# Patient Record
Sex: MALE | Race: WHITE | Employment: OTHER | URBAN - METROPOLITAN AREA
[De-identification: names, ages, dates, MRNs, and addresses within clinical notes are randomized per-mention and may not be internally consistent; named-entity substitution may affect disease eponyms.]

---

## 2017-01-17 RX ORDER — LEVOTHYROXINE SODIUM 50 UG/1
TABLET ORAL
Qty: 90 TAB | Refills: 0 | Status: SHIPPED | OUTPATIENT
Start: 2017-01-17 | End: 2017-04-10 | Stop reason: SDUPTHER

## 2017-04-10 RX ORDER — LEVOTHYROXINE SODIUM 50 UG/1
TABLET ORAL
Qty: 90 TAB | Refills: 0 | Status: SHIPPED | OUTPATIENT
Start: 2017-04-10 | End: 2017-07-09 | Stop reason: SDUPTHER

## 2017-04-19 ENCOUNTER — OFFICE VISIT (OUTPATIENT)
Dept: CARDIOLOGY CLINIC | Age: 77
End: 2017-04-19

## 2017-04-19 VITALS
BODY MASS INDEX: 39.16 KG/M2 | DIASTOLIC BLOOD PRESSURE: 70 MMHG | HEIGHT: 68 IN | HEART RATE: 60 BPM | RESPIRATION RATE: 16 BRPM | WEIGHT: 258.4 LBS | SYSTOLIC BLOOD PRESSURE: 120 MMHG

## 2017-04-19 DIAGNOSIS — R53.83 FATIGUE, UNSPECIFIED TYPE: ICD-10-CM

## 2017-04-19 DIAGNOSIS — R00.1 BRADYCARDIA: Primary | ICD-10-CM

## 2017-04-19 NOTE — PROGRESS NOTES
[address]: [place]  [phone]  HPI:   Mr. Mika Patel is a 77 yo M with family history of CAD seen in past for exertional shortness of breath. Treadmill stress echo in 8/13/14 was normal with no significant ischemia. Since his last visit, he feels about the same. Still with no chest pains. Still having exertional shortness of breath but suspects some of this may be asthma as he has some wheezing when this happens. Working with allergist on adjusting meds. ECG normal sinus rhythm, PRWP, non specific ST/T abnormality unchanged. He is compensated on exam.  Soc hx. No tobacco.  , children. Desk job tax work. Fam hx. Father with CAD in 76s. Treadmill chr incom    Assessment/Plan:  1. Shortness of breath. Stress test was normal and discussed results. Continue observation absent of chest pain and dyspnea possibly related to asthma. F/u in 1 yr or prn.   2. Obesity. Encouraged weight loss exercise. He  has a past medical history of Asthma (3/30/2010); Chronic prostatitis (3/30/2010); History of colonoscopy (3/30/2010); Hypothyroid (12/20/2011); Hypothyroidism; Prostate cancer ([address] (03/2013); and Skin cancer. He also has no past medical history of Abuse; Anemia NEC; Arrhythmia; Arthritis; Autoimmune disease ([address] CAD (coronary artery disease); Calculus of kidney; Chronic kidney disease; Chronic pain; Congestive heart failure, unspecified; COPD; Depression; Diabetes ([address] Essential hypertension; GERD (gastroesophageal reflux disease); Headache; Hypercholesterolemia; Hyperlipidemia; Hypertension; Liver disease; Psychotic disorder; PUD (peptic ulcer disease); Seizures ([address] Stroke [facility]); Thromboembolus ([address] or Trauma. All other systems negative except as above. PE  Vitals:    04/19/17 1521   BP: 120/70   Pulse: 60   Resp: 16   Weight: 258 lb 6.4 oz (117.2 kg)   Height: 5' 8\" (1.727 m)    Body mass index is 39.29 kg/(m^2).    General appearance - alert, well appearing, and in no distress  Mental status - affect appropriate to mood  Eyes - sclera anicteric, moist mucous membranes  Neck - supple, no significant adenopathy, no JVD  Chest-- clear to auscultation, no wheezes, rales or rhonchi  Heart - normal rate, regular rhythm, normal S1, S2, no murmurs, rubs, clicks or gallops  Abdomen - soft, nontender, nondistended, no masses or organomegaly  Neurological - no focal deficit  Extremities - peripheral pulses normal, no pedal edema      Recent Labs:  Lab Results   Component Value Date/Time    Cholesterol, total 189 11/11/2016 09:47 AM    HDL Cholesterol 62 11/11/2016 09:47 AM    LDL, calculated 106 11/11/2016 09:47 AM    Triglyceride 107 11/11/2016 09:47 AM    CHOL/HDL Ratio 3.2 07/16/2009 10:47 AM     Lab Results   Component Value Date/Time    Creatinine 0.95 11/11/2016 09:47 AM     Lab Results   Component Value Date/Time    BUN 19 11/11/2016 09:47 AM     Lab Results   Component Value Date/Time    Potassium 4.4 11/11/2016 09:47 AM     No results found for: HBA1C, [id number]  Lab Results   Component Value Date/Time    HGB 14.4 11/11/2016 09:47 AM     Lab Results   Component Value Date/Time    PLATELET 348 19/84/6517 09:47 AM       Reviewed:  Past Medical History:   Diagnosis Date    Asthma 3/30/2010    Chronic prostatitis 3/30/2010    History of colonoscopy 3/30/2010    Hypothyroid 12/20/2011    Hypothyroidism     [facility] 03/2013    Dr Marlyn Calvo    Skin cancer      History   Smoking Status    Never Smoker   Smokeless Tobacco    Never Used     History   Alcohol Use    Yes     Comment: occasional     No Known Allergies    Current Outpatient Prescriptions   Medication Sig    levothyroxine (SYNTHROID) 50 mcg tablet TAKE ONE TABLET BY MOUTH ONCE DAILY BEFORE BREAKFAST    doxycycline (VIBRAMYCIN) 100 mg capsule Take 100 mg by mouth daily.  cholecalciferol (VITAMIN D3) 1,000 unit tablet Take 1,000 Units by mouth daily.  DENTA 5000 PLUS 1.1 % crea two (2) times a day.     Silodosin (RAPAFLO) 8 mg Cap Take 8 mg by mouth daily.  GLUCOSAMINE SULFATE (GLUCOSAMINE PO) Take 1 Tab by mouth two (2) times a day.  fexofenadine (ALLEGRA) 180 mg tablet Take 1 Tab by mouth daily.  bupivacaine-EPINEPHrine (MARCAINE-EPINEPHRINE) 0.25 %-1:200,000 soln Used for mohs surgery    mometasone-formoterol (DULERA) 100-5 mcg/actuation HFA inhaler Take 2 Puffs by inhalation two (2) times a day. No current facility-administered medications for this visit.         Argenis Corrales MD  [facility] heart and Vascular [place]  [facility], [address] [facility] [address]  [address]

## 2017-04-19 NOTE — PROGRESS NOTES
[address]: [place]  [phone]  HPI:   Mr. Bev Bentley is a 79 yo M with family history of CAD seen in past for exertional shortness of breath. Treadmill stress echo in 14 was normal with no significant ischemia.     Here now with fatigue. Has been more noticeable the last 2 months. Fatigue is worse in the afternoon. No associated shortness of breath. No chest pain. He denies syncope, lightheadedness, dizziness or LE edema. He denies hx of sleep apnea. He says he occasionally snores. ECG 2016 shows sinus bradycardia, incomplete RBBB, 56 bpm.   Soc hx. No tobacco.  , children. 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 Father with CAD in 76s with bypass. No family hx of PPM. Uncle  age 49 yo of CHF. Assessment/Plan:  1. Fatigue/bradycardia. Has sinus bradycardia. Unclear etiology of fatigue. Will obtain treadmill stress test to assess for chronotropic incompetence. 2. Obesity. Reviewed diet and exercise. He  has a past medical history of Asthma (3/30/2010); Chronic prostatitis (3/30/2010); History of colonoscopy (3/30/2010); Hypothyroid (2011); Hypothyroidism; Prostate cancer ([address] (2013); and Skin cancer. He also has no past medical history of Abuse; Anemia NEC; Arrhythmia; Arthritis; Autoimmune disease ([address] CAD (coronary artery disease); Calculus of kidney; Chronic kidney disease; Chronic pain; Congestive heart failure, unspecified; COPD; Depression; Diabetes ([address] Essential hypertension; GERD (gastroesophageal reflux disease); Headache; Hypercholesterolemia; Hyperlipidemia; Hypertension; Liver disease; Psychotic disorder; PUD (peptic ulcer disease); Seizures ([address] Stroke [facility]); Thromboembolus ([address] or Trauma. All other systems negative except as above. PE  Vitals:    17 1521   BP: 120/70   Pulse: 60   Resp: 16   Weight: 258 lb 6.4 oz (117.2 kg)   Height: 5' 8\" (1.727 m)    Body mass index is 39.29 kg/(m^2). General appearance - alert, well appearing, and in no distress  Mental status - affect appropriate to mood  Eyes - sclera anicteric, moist mucous membranes  Neck - supple,  no JVD  Chest-- clear to auscultation, no wheezes, rales or rhonchi  Heart - normal rate, regular rhythm, normal S1, S2, no murmurs, rubs, clicks or gallops  Abdomen - soft, nontender, nondistended, no masses or organomegaly  Neurological - no focal deficit  Extremities - peripheral pulses normal, no pedal edema      Recent Labs:  Lab Results   Component Value Date/Time    Cholesterol, total 189 11/11/2016 09:47 AM    HDL Cholesterol 62 11/11/2016 09:47 AM    LDL, calculated 106 11/11/2016 09:47 AM    Triglyceride 107 11/11/2016 09:47 AM    CHOL/HDL Ratio 3.2 07/16/2009 10:47 AM     Lab Results   Component Value Date/Time    Creatinine 0.95 11/11/2016 09:47 AM     Lab Results   Component Value Date/Time    BUN 19 11/11/2016 09:47 AM     Lab Results   Component Value Date/Time    Potassium 4.4 11/11/2016 09:47 AM     No results found for: HBA1C, [id number]  Lab Results   Component Value Date/Time    HGB 14.4 11/11/2016 09:47 AM     Lab Results   Component Value Date/Time    PLATELET 754 27/27/0123 09:47 AM       Reviewed:  Past Medical History:   Diagnosis Date    Asthma 3/30/2010    Chronic prostatitis 3/30/2010    History of colonoscopy 3/30/2010    Hypothyroid 12/20/2011    Hypothyroidism     Pro[facility] ([facility] 03/2013    Dr Sloan Cerda    Skin cancer      History   Smoking Status    Never Smoker   Smokeless Tobacco    Never Used     History   Alcohol Use    Yes     Comment: occasional     No Known Allergies    Current Outpatient Prescriptions   Medication Sig    levothyroxine (SYNTHROID) 50 mcg tablet TAKE ONE TABLET BY MOUTH ONCE DAILY BEFORE BREAKFAST    doxycycline (VIBRAMYCIN) 100 mg capsule Take 100 mg by mouth daily.  cholecalciferol (VITAMIN D3) 1,000 unit tablet Take 1,000 Units by mouth daily.     DENTA 5000 PLUS 1.1 % crea two (2) times a day.  Silodosin (RAPAFLO) 8 mg Cap Take 8 mg by mouth daily.  GLUCOSAMINE SULFATE (GLUCOSAMINE PO) Take 1 Tab by mouth two (2) times a day.  fexofenadine (ALLEGRA) 180 mg tablet Take 1 Tab by mouth daily.  bupivacaine-EPINEPHrine (MARCAINE-EPINEPHRINE) 0.25 %-1:200,000 soln Used for mohs surgery    mometasone-formoterol (DULERA) 100-5 mcg/actuation HFA inhaler Take 2 Puffs by inhalation two (2) times a day. No current facility-administered medications for this visit.         Aaron Wooten MD  [facility] heart and Vascular [place]  [facility] 84, [address] [facility] [address]  [facility], [address] [place] [address]

## 2017-04-25 ENCOUNTER — CLINICAL SUPPORT (OUTPATIENT)
Dept: CARDIOLOGY CLINIC | Age: 77
End: 2017-04-25

## 2017-04-25 DIAGNOSIS — R00.1 BRADYCARDIA: ICD-10-CM

## 2017-04-25 DIAGNOSIS — R53.83 FATIGUE, UNSPECIFIED TYPE: ICD-10-CM

## 2017-04-26 ENCOUNTER — TELEPHONE (OUTPATIENT)
Dept: CARDIOLOGY CLINIC | Age: 77
End: 2017-04-26

## 2017-04-26 NOTE — TELEPHONE ENCOUNTER
----- Message from Colette Keebde MD sent at 4/25/2017  4:53 PM EDT -----  Please let pt know his treadmill stress test was normal.  The heart rate went up appropriately. thx        LVM for patient to return call at earliest convenience. Patient returned call, 2 pt identifiers used  Above test results given.

## 2017-07-10 RX ORDER — LEVOTHYROXINE SODIUM 50 UG/1
TABLET ORAL
Qty: 90 TAB | Refills: 0 | Status: SHIPPED | OUTPATIENT
Start: 2017-07-10 | End: 2017-10-16 | Stop reason: SDUPTHER

## 2017-10-16 ENCOUNTER — HOSPITAL ENCOUNTER (OUTPATIENT)
Dept: LAB | Age: 77
Discharge: HOME | End: 2017-10-16
Payer: MEDICARE

## 2017-10-16 ENCOUNTER — OFFICE VISIT (OUTPATIENT)
Dept: FAMILY MEDICINE CLINIC | Age: 77
End: 2017-10-16

## 2017-10-16 VITALS
TEMPERATURE: 98 F | SYSTOLIC BLOOD PRESSURE: 133 MMHG | DIASTOLIC BLOOD PRESSURE: 84 MMHG | RESPIRATION RATE: 18 BRPM | HEART RATE: 69 BPM | WEIGHT: 248.6 LBS | OXYGEN SATURATION: 98 % | BODY MASS INDEX: 37.68 KG/M2 | HEIGHT: 68 IN

## 2017-10-16 DIAGNOSIS — Z83.3 FAMILY HISTORY OF DIABETES MELLITUS: ICD-10-CM

## 2017-10-16 DIAGNOSIS — E03.9 ACQUIRED HYPOTHYROIDISM: Primary | ICD-10-CM

## 2017-10-16 PROBLEM — R79.89 LOW VITAMIN D LEVEL: Status: ACTIVE | Noted: 2017-10-16

## 2017-10-16 PROCEDURE — 84443 ASSAY THYROID STIM HORMONE: CPT

## 2017-10-16 PROCEDURE — 83036 HEMOGLOBIN GLYCOSYLATED A1C: CPT

## 2017-10-16 PROCEDURE — 80053 COMPREHEN METABOLIC PANEL: CPT

## 2017-10-16 PROCEDURE — 84439 ASSAY OF FREE THYROXINE: CPT

## 2017-10-16 RX ORDER — LEVOTHYROXINE SODIUM 50 UG/1
TABLET ORAL
Qty: 90 TAB | Refills: 0 | Status: SHIPPED | OUTPATIENT
Start: 2017-10-16 | End: 2017-12-13

## 2017-10-16 NOTE — PROGRESS NOTES
HISTORY OF PRESENT ILLNESS  Lashawn Pastor is a 78 y.o. male. HPI: Following up on hypothyroidism, taking medication as prescribed, no medication side effects noted. Patient is requesting to check for diabetes, has family history of diabetes. He had cortisone injection on his left knee last week by his orthopedics. He is not considering surgery yet. Past Medical History:   Diagnosis Date    Asthma 3/30/2010    Chronic prostatitis 3/30/2010    History of colonoscopy 3/30/2010    Hypothyroid 12/20/2011    Hypothyroidism     Prostate [facility] ([facility] 03/2013    Dr Aby Cruz    Skin cancer      Past Surgical History:   Procedure Laterality Date    HX COLONOSCOPY  June 2013    polyps were found. Go back in 3 years.  HX OTHER SURGICAL  early 2015    mohs surgery   No Known Allergies    Current Outpatient Prescriptions:     levothyroxine (SYNTHROID) 50 mcg tablet, Take 1 tab po 30 minutes before breakfast, Disp: 90 Tab, Rfl: 0    doxycycline (VIBRAMYCIN) 100 mg capsule, Take 100 mg by mouth daily. , Disp: , Rfl:     cholecalciferol (VITAMIN D3) 1,000 unit tablet, Take 1,000 Units by mouth daily. , Disp: , Rfl:     DENTA 5000 PLUS 1.1 % crea, two (2) times a day., Disp: , Rfl:     mometasone-formoterol (DULERA) 100-5 mcg/actuation HFA inhaler, Take 2 Puffs by inhalation two (2) times a day., Disp: 1 Inhaler, Rfl: 1    Silodosin (RAPAFLO) 8 mg Cap, Take 8 mg by mouth daily. , Disp: , Rfl:     GLUCOSAMINE SULFATE (GLUCOSAMINE PO), Take 1 Tab by mouth two (2) times a day., Disp: , Rfl:     bupivacaine-EPINEPHrine (MARCAINE-EPINEPHRINE) 0.25 %-1:200,000 soln, Used for mohs surgery, Disp: 1.5 mL, Rfl: 0  Review of Systems   Constitutional: Negative. Respiratory: Negative. Cardiovascular: Negative. Gastrointestinal: Negative. Blood pressure 133/84, pulse 69, temperature 98 °F (36.7 °C), temperature source Oral, resp.  rate 18, height 5' 8\" (1.727 m), weight 248 lb 9.6 oz (112.8 kg), SpO2 98 %.    Physical Exam   Constitutional: No distress. HENT:   Mouth/Throat: Oropharynx is clear and moist.   Neck: Neck supple. Cardiovascular: Normal rate and regular rhythm. No murmur heard. Pulmonary/Chest: Effort normal and breath sounds normal.   Abdominal: Soft. Bowel sounds are normal.   Nursing note and vitals reviewed. ASSESSMENT and PLAN    ICD-10-CM ICD-9-CM    1. Acquired hypothyroidism E03.9 244.9 TSH 3RD GENERATION      T4, FREE      levothyroxine (SYNTHROID) 50 mcg tablet   2.  Family history of diabetes mellitus S22.3 S39.1 METABOLIC PANEL, COMPREHENSIVE      HEMOGLOBIN A1C WITH EAG   await labs  Follow up for medicare physical  Pt was given an after visit summary which includes diagnosis, current medicines and vital and voiced understanding of treatment plan

## 2017-10-16 NOTE — PROGRESS NOTES
1. Have you been to the ER, urgent care clinic since your last visit? Hospitalized since your last visit? No    2. Have you seen or consulted any other health care providers outside of the [address] [place] [address] since your last visit? Include any pap smears or colon screening. No     Chief Complaint   Patient presents with    Medication Evaluation     patient here for follow up/refill     Learning assessment complete  Abuse Screening Questionnaire 10/16/2017   Do you ever feel afraid of your partner? N   Are you in a relationship with someone who physically or mentally threatens you? N   Is it safe for you to go home? Y     Fall Risk Assessment, last 12 mths 10/16/2017   Able to walk? Yes   Fall in past 12 months?  No

## 2017-10-17 LAB
ALBUMIN SERPL-MCNC: 4.2 G/DL (ref 3.5–4.8)
ALBUMIN/GLOB SERPL: 1.7 {RATIO} (ref 1.2–2.2)
ALP SERPL-CCNC: 67 IU/L (ref 39–117)
ALT SERPL-CCNC: 14 IU/L (ref 0–44)
AST SERPL-CCNC: 22 IU/L (ref 0–40)
BILIRUB SERPL-MCNC: 0.6 MG/DL (ref 0–1.2)
BUN SERPL-MCNC: 18 MG/DL (ref 8–27)
BUN/CREAT SERPL: 17 (ref 10–24)
CALCIUM SERPL-MCNC: 9.4 MG/DL (ref 8.6–10.2)
CHLORIDE SERPL-SCNC: 98 MMOL/L (ref 96–106)
CO2 SERPL-SCNC: 27 MMOL/L (ref 18–29)
CREAT SERPL-MCNC: 1.06 MG/DL (ref 0.76–1.27)
EST. AVERAGE GLUCOSE BLD GHB EST-MCNC: 114 MG/DL
GLOBULIN SER CALC-MCNC: 2.5 G/DL (ref 1.5–4.5)
GLUCOSE SERPL-MCNC: 98 MG/DL (ref 65–99)
HBA1C MFR BLD: 5.6 % (ref 4.8–5.6)
POTASSIUM SERPL-SCNC: 4.6 MMOL/L (ref 3.5–5.2)
PROT SERPL-MCNC: 6.7 G/DL (ref 6–8.5)
SODIUM SERPL-SCNC: 140 MMOL/L (ref 134–144)
T4 FREE SERPL-MCNC: 1.2 NG/DL (ref 0.82–1.77)
TSH SERPL DL<=0.005 MIU/L-ACNC: 3.13 UIU/ML (ref 0.45–4.5)

## 2017-12-12 ENCOUNTER — HOSPITAL ENCOUNTER (OUTPATIENT)
Dept: LAB | Age: 77
Discharge: HOME | End: 2017-12-12
Payer: MEDICARE

## 2017-12-12 ENCOUNTER — OFFICE VISIT (OUTPATIENT)
Dept: FAMILY MEDICINE CLINIC | Age: 77
End: 2017-12-12

## 2017-12-12 VITALS
RESPIRATION RATE: 18 BRPM | WEIGHT: 246.6 LBS | HEART RATE: 60 BPM | TEMPERATURE: 98.7 F | HEIGHT: 68 IN | OXYGEN SATURATION: 98 % | DIASTOLIC BLOOD PRESSURE: 80 MMHG | BODY MASS INDEX: 37.38 KG/M2 | SYSTOLIC BLOOD PRESSURE: 132 MMHG

## 2017-12-12 DIAGNOSIS — Z71.89 ACP (ADVANCE CARE PLANNING): ICD-10-CM

## 2017-12-12 DIAGNOSIS — R79.89 LOW VITAMIN D LEVEL: ICD-10-CM

## 2017-12-12 DIAGNOSIS — N41.1 CHRONIC PROSTATITIS: ICD-10-CM

## 2017-12-12 DIAGNOSIS — Z00.00 ENCOUNTER FOR INITIAL PREVENTIVE PHYSICAL EXAMINATION COVERED BY MEDICARE: Primary | ICD-10-CM

## 2017-12-12 DIAGNOSIS — E66.9 OBESITY (BMI 30-39.9): ICD-10-CM

## 2017-12-12 DIAGNOSIS — E03.9 ACQUIRED HYPOTHYROIDISM: ICD-10-CM

## 2017-12-12 PROCEDURE — 80053 COMPREHEN METABOLIC PANEL: CPT

## 2017-12-12 PROCEDURE — 82306 VITAMIN D 25 HYDROXY: CPT

## 2017-12-12 PROCEDURE — 80061 LIPID PANEL: CPT

## 2017-12-12 PROCEDURE — 85027 COMPLETE CBC AUTOMATED: CPT

## 2017-12-12 PROCEDURE — 84443 ASSAY THYROID STIM HORMONE: CPT

## 2017-12-12 PROCEDURE — 36415 COLL VENOUS BLD VENIPUNCTURE: CPT

## 2017-12-12 NOTE — PROGRESS NOTES
Lola Sesay is a 78 y.o. male and presents for annual Medicare Wellness Visit. Problem List: Reviewed with patient and discussed risk factors. Patient Active Problem List   Diagnosis Code    Asthma J45.909    Chronic prostatitis N41.1    History of colonoscopy Z98.890    Acquired hypothyroidism E03.9    Low vitamin D level E55.9    Obesity (BMI 30-39. 9) E66.9       Current medical providers:  Patient Care Team:  Kanu Hogan NP as PCP - General (Family Practice)  Cj Flores MD (Cardiology)    [facility]: Reviewed with patient  Past Surgical History:   Procedure Laterality Date    HX COLONOSCOPY  June 2013    polyps were found. Go back in 3 years.  HX OTHER SURGICAL  early 2015    mohs surgery        SH: Reviewed with patient  Social History   Substance Use Topics    Smoking status: Never Smoker    Smokeless tobacco: Never Used    Alcohol use Yes      Comment: occasional       FH: Reviewed with patient  Family History   Problem Relation Age of Onset    Diabetes Mother     Blindness Mother      due to diabetes    Kidney Disease Father     Cancer Father     Heart Surgery Father     Coronary Artery Disease Father     Cancer Sister     Diabetes Brother     Breast Cancer Sister     Alcohol abuse Daughter        Medications/Allergies: Reviewed with patient  Current Outpatient Prescriptions on File Prior to Visit   Medication Sig Dispense Refill    levothyroxine (SYNTHROID) 50 mcg tablet Take 1 tab po 30 minutes before breakfast 90 Tab 0    bupivacaine-EPINEPHrine (MARCAINE-EPINEPHRINE) 0.25 %-1:200,000 soln Used for mohs surgery 1.5 mL 0    doxycycline (VIBRAMYCIN) 100 mg capsule Take 100 mg by mouth daily.  cholecalciferol (VITAMIN D3) 1,000 unit tablet Take 1,000 Units by mouth daily.  DENTA 5000 PLUS 1.1 % crea two (2) times a day.  Silodosin (RAPAFLO) 8 mg Cap Take 8 mg by mouth daily.       GLUCOSAMINE SULFATE (GLUCOSAMINE PO) Take 1 Tab by mouth two (2) times a day.      mometasone-formoterol (DULERA) 100-5 mcg/actuation HFA inhaler Take 2 Puffs by inhalation two (2) times a day. 1 Inhaler 1     No current facility-administered medications on file prior to visit. No Known Allergies    Objective:  Visit Vitals    /80 (BP 1 Location: Left arm, BP Patient Position: Sitting)    Pulse 60    Temp 98.7 °F (37.1 °C) (Oral)    Resp 18    Ht 5' 8\" (1.727 m)    Wt 246 lb 9.6 oz (111.9 kg)    SpO2 98%    BMI 37.5 kg/m2    Body mass index is 37.5 kg/(m^2). Assessment of cognitive impairment: Alert and oriented x 3  Depression Screen:   PHQ over the last two weeks 12/12/2017   Little interest or pleasure in doing things Not at all   Feeling down, depressed or hopeless Not at all   Total Score PHQ 2 0       Fall Risk Assessment:    Fall Risk Assessment, last 12 mths 12/12/2017   Able to walk? Yes   Fall in past 12 months? No       Functional Ability:   Does the patient exhibit a steady gait? yes   How long did it take the patient to get up and walk from a sitting position? One sencnd   Is the patient self reliant?  (ie can do own laundry, meals, household chores)  yes     Does the patient handle his/her own medications? yes     Does the patient handle his/her own money? yes     Is the patients home safe (ie good lighting, handrails on stairs and bath, etc.)? yes     Did you notice or did patient express any hearing difficulties? Yes, will refer to ENT     Did you notice or did patient express any vision difficulties?   no     Were distance and reading eye charts used? no       Advance Care Planning:   Patient was offered the opportunity to discuss advance care planning:  yes     Does patient have an Advance Directive:  yes   If no, did you provide information on Caring Connections?   No, will bring in a copy       Plan:      Orders Placed This Encounter    CBC W/O DIFF    METABOLIC PANEL, COMPREHENSIVE    VITAMIN D, 25 HYDROXY    LIPID PANEL    TSH 3RD GENERATION    OCCULT BLOOD, IMMUNOASSAY (FIT)       Health Maintenance   Topic Date Due    FOBT Q 1 YEAR AGE 50-75  11/18/1992    GLAUCOMA SCREENING Q2Y  11/18/2007    MEDICARE YEARLY EXAM  12/13/2018    DTaP/Tdap/Td series (3 - Td) 12/12/2027    ZOSTER VACCINE AGE 60>  Completed    Pneumococcal 65+ Low/Medium Risk  Completed    Influenza Age 5 to Adult  Completed       *Patient verbalized understanding and agreement with the plan. A copy of the After Visit Summary with personalized health plan was given to the patient today.

## 2017-12-12 NOTE — PROGRESS NOTES
1. Have you been to the ER, urgent care clinic since your last visit? Hospitalized since your last visit? No    2. Have you seen or consulted any other health care providers outside of the [address] [place] [address] since your last visit? Include any pap smears or colon screening. No     Chief Complaint   Patient presents with   [employer] Annual Wellness Visit     patient here for annual exam, no concerns     Learning assessment complete  Abuse Screening Questionnaire 10/16/2017   Do you ever feel afraid of your partner? N   Are you in a relationship with someone who physically or mentally threatens you? N   Is it safe for you to go home? Y     Fall Risk Assessment, last 12 mths 12/12/2017   Able to walk? Yes   Fall in past 12 months?  No

## 2017-12-12 NOTE — ACP (ADVANCE CARE PLANNING)
Advance Care Planning (ACP) Provider Conversation Snapshot    Date of ACP Conversation: 12/12/17  Persons included in Conversation:  patient  Length of ACP Conversation in minutes:  <16 minutes (Non-Billable)    Authorized Decision Maker (if patient is incapable of making informed decisions):    This person is:   n/a          For Patients with Decision Making Capacity:   YES    Conversation Outcomes / Follow-Up Plan:   HE HAS ACP, WILL BRING IN A COPY

## 2017-12-12 NOTE — PROGRESS NOTES
HISTORY OF PRESENT ILLNESS  Bob Rinaldi is a 78 y.o. male. HPI: Patient comes in for medicare physical, he has history of chronic prostatitis, obesity,  hypothyroidism and low vitamin D level  Elevated PSA: For his prostatitis he is under care of urologist, he reports that his last PSA  done a months ago and it was elevated, then he did MRI but it didn't show any changes from previous MRI. He is scheduled for prostate biopsy next month    Hypothyroidism: Last PSA was 3.130, taking synthroid 50 mcg daily , doing well on current dose. Low vitamin D: taking Vitamin D 1000 international unit for vitamin D 3 of 27.1 needs repeat vitamin D level     Obesity: Patient is obese, he reports that he doesn't do regular exercises but works on his yard cutting grass and is active all day. He states that in spite of his knee pain he still works out side. He has appointment with ortho for possible knee surgery. He hasn't been watching his diet as she should. Past Medical History:   Diagnosis Date    Asthma 3/30/2010    Chronic prostatitis 3/30/2010    History of colonoscopy 3/30/2010    Hypothyroid 12/20/2011    Hypothyroidism     Pro[facility] ([facility]/2013    Dr Eyal Duque    Skin cancer      Past Surgical History:   Procedure Laterality Date    HX COLONOSCOPY  June 2013    polyps were found. Go back in 3 years.  HX OTHER SURGICAL  early 2015    mohs surgery   No Known Allergies    Current Outpatient Prescriptions:     levothyroxine (SYNTHROID) 50 mcg tablet, Take 1 tab po 30 minutes before breakfast, Disp: 90 Tab, Rfl: 0    bupivacaine-EPINEPHrine (MARCAINE-EPINEPHRINE) 0.25 %-1:200,000 soln, Used for mohs surgery, Disp: 1.5 mL, Rfl: 0    doxycycline (VIBRAMYCIN) 100 mg capsule, Take 100 mg by mouth daily. , Disp: , Rfl:     cholecalciferol (VITAMIN D3) 1,000 unit tablet, Take 1,000 Units by mouth daily. , Disp: , Rfl:     DENTA 5000 PLUS 1.1 % crea, two (2) times a day., Disp: , Rfl:     Silodosin (RAPAFLO) 8 mg Cap, Take 8 mg by mouth daily. , Disp: , Rfl:     GLUCOSAMINE SULFATE (GLUCOSAMINE PO), Take 1 Tab by mouth two (2) times a day., Disp: , Rfl:     mometasone-formoterol (DULERA) 100-5 mcg/actuation HFA inhaler, Take 2 Puffs by inhalation two (2) times a day., Disp: 1 Inhaler, Rfl: 1  Review of Systems   Constitutional: Negative. HENT: Negative. Respiratory: Negative. Cardiovascular: Negative. Gastrointestinal: Negative. Genitourinary: Negative. Musculoskeletal: Positive for joint pain. Skin: Negative. Neurological: Negative. Psychiatric/Behavioral: Negative. Blood pressure 132/80, pulse 60, temperature 98.7 °F (37.1 °C), temperature source Oral, resp. rate 18, height 5' 8\" (1.727 m), weight 246 lb 9.6 oz (111.9 kg), SpO2 98 %. Physical Exam   Constitutional: He is oriented to person, place, and time. No distress. HENT:   Mouth/Throat: Oropharynx is clear and moist.   Eyes: Conjunctivae and EOM are normal. Pupils are equal, round, and reactive to light. Neck: Normal range of motion. Neck supple. Cardiovascular: Normal rate and regular rhythm. No murmur heard. Pulmonary/Chest: Effort normal and breath sounds normal.   Abdominal: Soft. Bowel sounds are normal.   Musculoskeletal: Normal range of motion. He exhibits tenderness. He exhibits no edema. Neurological: He is alert and oriented to person, place, and time. Skin: Skin is warm and dry. Psychiatric: He has a normal mood and affect. His behavior is normal.   Nursing note and vitals reviewed. ASSESSMENT and PLAN  Diagnoses and all orders for this visit:    1. Encounter for initial preventive physical examination covered by Medicare  -     CBC W/O DIFF  -     METABOLIC PANEL, COMPREHENSIVE  -     LIPID PANEL  -     OCCULT BLOOD, IMMUNOASSAY (FIT)    2. Chronic prostatitis  Sees a urologist  3. Acquired hypothyroidism  -     TSH 3RD GENERATION    4.  Low vitamin D level  -     VITAMIN D, 25 HYDROXY    5. Obesity (BMI 30-39. 9)  Normal BMI discussed with the patient, advised to increase exercise and watch diet    6.  ACP (advance care planning)  Will bring in a copy for his chart  Follow up in six months  Pt was given an after visit summary which includes diagnosis, current medicines and vital and voiced understanding of treatment plan

## 2017-12-13 ENCOUNTER — TELEPHONE (OUTPATIENT)
Dept: FAMILY MEDICINE CLINIC | Age: 77
End: 2017-12-13

## 2017-12-13 ENCOUNTER — HOSPITAL ENCOUNTER (OUTPATIENT)
Dept: LAB | Age: 77
Discharge: HOME | End: 2017-12-13
Payer: MEDICARE

## 2017-12-13 LAB
25(OH)D3+25(OH)D2 SERPL-MCNC: 37.7 NG/ML (ref 30–100)
ALBUMIN SERPL-MCNC: 4.3 G/DL (ref 3.5–4.8)
ALBUMIN/GLOB SERPL: 2 {RATIO} (ref 1.2–2.2)
ALP SERPL-CCNC: 62 IU/L (ref 39–117)
ALT SERPL-CCNC: 16 IU/L (ref 0–44)
AST SERPL-CCNC: 22 IU/L (ref 0–40)
BILIRUB SERPL-MCNC: 0.7 MG/DL (ref 0–1.2)
BUN SERPL-MCNC: 12 MG/DL (ref 8–27)
BUN/CREAT SERPL: 13 (ref 10–24)
CALCIUM SERPL-MCNC: 9.5 MG/DL (ref 8.6–10.2)
CHLORIDE SERPL-SCNC: 102 MMOL/L (ref 96–106)
CHOLEST SERPL-MCNC: 173 MG/DL (ref 100–199)
CO2 SERPL-SCNC: 26 MMOL/L (ref 18–29)
CREAT SERPL-MCNC: 0.94 MG/DL (ref 0.76–1.27)
ERYTHROCYTE [DISTWIDTH] IN BLOOD BY AUTOMATED COUNT: 13.7 % (ref 12.3–15.4)
GFR SERPLBLD CREATININE-BSD FMLA CKD-EPI: 79 ML/MIN/1.73
GFR SERPLBLD CREATININE-BSD FMLA CKD-EPI: 91 ML/MIN/1.73
GLOBULIN SER CALC-MCNC: 2.1 G/DL (ref 1.5–4.5)
GLUCOSE SERPL-MCNC: 99 MG/DL (ref 65–99)
HCT VFR BLD AUTO: 42.9 % (ref 37.5–51)
HDLC SERPL-MCNC: 61 MG/DL
HGB BLD-MCNC: 14.4 G/DL (ref 13–17.7)
INTERPRETATION, 910389: NORMAL
LDLC SERPL CALC-MCNC: 89 MG/DL (ref 0–99)
MCH RBC QN AUTO: 31.6 PG (ref 26.6–33)
MCHC RBC AUTO-ENTMCNC: 33.6 G/DL (ref 31.5–35.7)
MCV RBC AUTO: 94 FL (ref 79–97)
PLATELET # BLD AUTO: 192 X10E3/UL (ref 150–379)
POTASSIUM SERPL-SCNC: 4.4 MMOL/L (ref 3.5–5.2)
PROT SERPL-MCNC: 6.4 G/DL (ref 6–8.5)
RBC # BLD AUTO: 4.55 X10E6/UL (ref 4.14–5.8)
SODIUM SERPL-SCNC: 141 MMOL/L (ref 134–144)
TRIGL SERPL-MCNC: 113 MG/DL (ref 0–149)
TSH SERPL DL<=0.005 MIU/L-ACNC: 4.69 UIU/ML (ref 0.45–4.5)
VLDLC SERPL CALC-MCNC: 23 MG/DL (ref 5–40)
WBC # BLD AUTO: 7.3 X10E3/UL (ref 3.4–10.8)

## 2017-12-13 PROCEDURE — 82270 OCCULT BLOOD FECES: CPT

## 2017-12-13 RX ORDER — LEVOTHYROXINE SODIUM 75 UG/1
75 TABLET ORAL
Qty: 90 TAB | Refills: 1 | Status: SHIPPED | OUTPATIENT
Start: 2017-12-13 | End: 2018-05-18 | Stop reason: SDUPTHER

## 2017-12-16 LAB — HEMOCCULT STL QL IA: POSITIVE

## 2017-12-18 DIAGNOSIS — R19.5 HEME POSITIVE STOOL: Primary | ICD-10-CM

## 2018-01-05 ENCOUNTER — TELEPHONE (OUTPATIENT)
Dept: FAMILY MEDICINE CLINIC | Age: 78
End: 2018-01-05

## 2018-01-05 NOTE — TELEPHONE ENCOUNTER
----- Message from Alexandr Albertacaren sent at 1/4/2018  4:26 PM EST -----  Regarding: JOSÉ MIGUEL Singleton/Telephone  Pt has L knee surgery with Dr. Lizett Bishop on 1/30/18 and wants to know if his recent CPE in 12/2017 can be used for this. He has a Pre-op scheduled with Dr. Jenny Redmond on 1/23/18 but if he needs to come in, he would like to be fit in with JOSÉ MIGUEL Singleton. Best contact number [phone].

## 2018-01-12 ENCOUNTER — OFFICE VISIT (OUTPATIENT)
Dept: FAMILY MEDICINE CLINIC | Age: 78
End: 2018-01-12

## 2018-01-12 VITALS
TEMPERATURE: 97.5 F | HEART RATE: 85 BPM | DIASTOLIC BLOOD PRESSURE: 81 MMHG | OXYGEN SATURATION: 96 % | HEIGHT: 68 IN | BODY MASS INDEX: 38.01 KG/M2 | SYSTOLIC BLOOD PRESSURE: 115 MMHG | WEIGHT: 250.8 LBS | RESPIRATION RATE: 16 BRPM

## 2018-01-12 DIAGNOSIS — R79.89 LOW VITAMIN D LEVEL: ICD-10-CM

## 2018-01-12 DIAGNOSIS — N41.1 CHRONIC PROSTATITIS: ICD-10-CM

## 2018-01-12 DIAGNOSIS — E03.9 ACQUIRED HYPOTHYROIDISM: ICD-10-CM

## 2018-01-12 DIAGNOSIS — E66.9 OBESITY (BMI 30-39.9): ICD-10-CM

## 2018-01-12 DIAGNOSIS — Z01.818 PRE-OP EXAM: Primary | ICD-10-CM

## 2018-01-12 RX ORDER — SILODOSIN 8 MG/1
CAPSULE ORAL
Status: ON HOLD | COMMUNITY
Start: 2017-10-04 | End: 2018-05-08 | Stop reason: CLARIF

## 2018-01-12 RX ORDER — LEVOTHYROXINE SODIUM 50 UG/1
TABLET ORAL
COMMUNITY
Start: 2017-07-10 | End: 2018-01-12

## 2018-01-12 RX ORDER — GLUCOSAMINE SULFATE 1500 MG
5000 POWDER IN PACKET (EA) ORAL
COMMUNITY

## 2018-01-12 RX ORDER — DOXYCYCLINE 100 MG/1
CAPSULE ORAL
COMMUNITY
Start: 2017-10-04 | End: 2018-01-12

## 2018-01-12 NOTE — PROGRESS NOTES
Chief Complaint   Patient presents with    Pre-op Exam     Total Knee Replacement 1/30/2018     1. Have you been to the ER, urgent care clinic since your last visit? Hospitalized since your last visit? No    2. Have you seen or consulted any other health care providers outside of the [facility] since your last visit? Include any pap smears or colon screening.  Yes Dr. Talha Trejo

## 2018-01-12 NOTE — PROGRESS NOTES
Preoperative Evaluation    Date of Exam: 2018    Dejah Gabriel is a 78 y.o. male (:1940) who presents for preoperative evaluation. Latex Allergy: no    Medical History:     Past Medical History:   Diagnosis Date    Asthma 3/30/2010    Chronic prostatitis 3/30/2010    History of colonoscopy 3/30/2010    Hypothyroid 2011    Hypothyroidism     Prostate cancer ([facility] [address] 2013    Dr Giancarlo Maldonado    Skin cancer      Allergies:   No Known Allergies   Medications:     Current Outpatient Prescriptions   Medication Sig    GLUCOSAMINE SULFATE PO Take 1 Tab by mouth two (2) times a day.  levothyroxine (SYNTHROID) 75 mcg tablet Take 1 Tab by mouth Daily (before breakfast).  doxycycline (VIBRAMYCIN) 100 mg capsule Take 100 mg by mouth daily.  DENTA 5000 PLUS 1.1 % crea two (2) times a day.  Silodosin (RAPAFLO) 8 mg Cap Take 8 mg by mouth daily.  cholecalciferol (VITAMIN D3) 1,000 unit cap Take 1,000 Units by mouth daily.  silodosin (RAPAFLO) 8 mg capsule      No current facility-administered medications for this visit. Surgical History:     Past Surgical History:   Procedure Laterality Date    HX COLONOSCOPY  2013    polyps were found. Go back in 3 years.  HX OTHER SURGICAL  early     [facility]     Social History:     Social History     Social History    Marital status:      Spouse name: N/A    Number of children: N/A    Years of education: N/A     Social History Main Topics    Smoking status: Never Smoker    Smokeless tobacco: Never Used    Alcohol use Yes      Comment: occasional    Drug use: No    Sexual activity: Not Asked     Other Topics Concern    None     Social History Narrative       Anesthesia Complications: None  History of abnormal bleeding : None  History of Blood Transfusions: no  Health Care Directive or Living Will: yes, will bring in a copy for his chart    Objective:     ROS:    Feeling well.  No dyspnea or chest pain on exertion. No abdominal pain, change in bowel habits, black or bloody stools. No urinary tract or prostatic symptoms. No neurological complaints. OBJECTIVE:   The patient appears well, alert, oriented x 3, in no distress. Visit Vitals    /81 (BP 1 Location: Left arm, BP Patient Position: Sitting)    Pulse 85    Temp 97.5 °F (36.4 °C) (Oral)    Resp 16    Ht 5' 8\" (1.727 m)    Wt 250 lb 12.8 oz (113.8 kg)    SpO2 96%    BMI 38.13 kg/m2     ENT normal.  Neck supple. No adenopathy or thyromegaly. FRIDA. Lungs are clear, good air entry, no wheezes, rhonchi or rales. Cardiovascular:S1 and S2 normal, no murmurs, regular rate and rhythm. Abdomen is soft without tenderness, guarding, mass or organomegaly.  exam: not chcked. Extremities show no edema, normal peripheral pulses. Neurological is normal without focal findings. DIAGNOSTICS:   1. EKG: EKG FINDINGS - old EKG show incomplete right bundle branch block  2. CXR: will be done in the hospital, pre op  3.  Labs: [facility], pre op    IMPRESSION:     Low risk for planned surgery  No contraindications to planned surgery, pending normal labs    Adela Lui NP   1/12/2018

## 2018-01-22 ENCOUNTER — TELEPHONE (OUTPATIENT)
Dept: FAMILY MEDICINE CLINIC | Age: 78
End: 2018-01-22

## 2018-01-22 NOTE — TELEPHONE ENCOUNTER
----- Message from Goran Cross sent at 1/22/2018  3:30 PM EST -----  Regarding: Areli VALDEZ/Telephone Brion Landau, from [address], is requesting last visit notes, stress testing results and EKG . Best contact office  [phone] Fax [phone].

## 2018-05-08 ENCOUNTER — HOSPITAL ENCOUNTER (OUTPATIENT)
Age: 78
Setting detail: OUTPATIENT SURGERY
Discharge: HOME | End: 2018-05-08
Attending: SPECIALIST | Admitting: SPECIALIST
Payer: MEDICARE

## 2018-05-08 ENCOUNTER — ANESTHESIA EVENT (OUTPATIENT)
Dept: ENDOSCOPY | Age: 78
End: 2018-05-08
Payer: MEDICARE

## 2018-05-08 ENCOUNTER — ANESTHESIA (OUTPATIENT)
Dept: ENDOSCOPY | Age: 78
End: 2018-05-08
Payer: MEDICARE

## 2018-05-08 VITALS
HEIGHT: 69 IN | OXYGEN SATURATION: 97 % | HEART RATE: 67 BPM | TEMPERATURE: 97.8 F | DIASTOLIC BLOOD PRESSURE: 72 MMHG | WEIGHT: 235.06 LBS | BODY MASS INDEX: 34.81 KG/M2 | SYSTOLIC BLOOD PRESSURE: 125 MMHG | RESPIRATION RATE: 16 BRPM

## 2018-05-08 PROCEDURE — 74011250636 HC RX REV CODE- 250/636

## 2018-05-08 PROCEDURE — 74011250636 HC RX REV CODE- 250/636: Performed by: SPECIALIST

## 2018-05-08 PROCEDURE — 76040000009: Performed by: SPECIALIST

## 2018-05-08 PROCEDURE — 74011250637 HC RX REV CODE- 250/637: Performed by: SPECIALIST

## 2018-05-08 PROCEDURE — 77030009426 HC FCPS BIOP ENDOSC BSC -B: Performed by: SPECIALIST

## 2018-05-08 PROCEDURE — 74011000250 HC RX REV CODE- 250

## 2018-05-08 PROCEDURE — 77030010937 HC CLP LIG BSC -I: Performed by: SPECIALIST

## 2018-05-08 PROCEDURE — 88305 TISSUE EXAM BY PATHOLOGIST: CPT | Performed by: SPECIALIST

## 2018-05-08 PROCEDURE — 77030011640 HC PAD GRND REM COVD -A: Performed by: SPECIALIST

## 2018-05-08 PROCEDURE — 77030027957 HC TBNG IRR ENDOGTR BUSS -B: Performed by: SPECIALIST

## 2018-05-08 PROCEDURE — 77030010936 HC CLP LIG BSC -C: Performed by: SPECIALIST

## 2018-05-08 PROCEDURE — 76060000034 HC ANESTHESIA 1.5 TO 2 HR: Performed by: SPECIALIST

## 2018-05-08 PROCEDURE — 77030003657 HC NDL SCLER BSC -B: Performed by: SPECIALIST

## 2018-05-08 PROCEDURE — 77030013992 HC SNR POLYP ENDOSC BSC -B: Performed by: SPECIALIST

## 2018-05-08 RX ORDER — NALOXONE HYDROCHLORIDE 0.4 MG/ML
0.4 INJECTION, SOLUTION INTRAMUSCULAR; INTRAVENOUS; SUBCUTANEOUS
Status: DISCONTINUED | OUTPATIENT
Start: 2018-05-08 | End: 2018-05-08 | Stop reason: HOSPADM

## 2018-05-08 RX ORDER — LORAZEPAM 2 MG/ML
2 INJECTION INTRAMUSCULAR AS NEEDED
Status: DISCONTINUED | OUTPATIENT
Start: 2018-05-08 | End: 2018-05-08 | Stop reason: HOSPADM

## 2018-05-08 RX ORDER — MIDAZOLAM HYDROCHLORIDE 1 MG/ML
.25-1 INJECTION, SOLUTION INTRAMUSCULAR; INTRAVENOUS
Status: DISCONTINUED | OUTPATIENT
Start: 2018-05-08 | End: 2018-05-08 | Stop reason: HOSPADM

## 2018-05-08 RX ORDER — SODIUM CHLORIDE 9 MG/ML
INJECTION, SOLUTION INTRAVENOUS
Status: DISCONTINUED | OUTPATIENT
Start: 2018-05-08 | End: 2018-05-08 | Stop reason: HOSPADM

## 2018-05-08 RX ORDER — FLUMAZENIL 0.1 MG/ML
0.2 INJECTION INTRAVENOUS
Status: DISCONTINUED | OUTPATIENT
Start: 2018-05-08 | End: 2018-05-08 | Stop reason: HOSPADM

## 2018-05-08 RX ORDER — LIDOCAINE HYDROCHLORIDE 20 MG/ML
INJECTION, SOLUTION EPIDURAL; INFILTRATION; INTRACAUDAL; PERINEURAL AS NEEDED
Status: DISCONTINUED | OUTPATIENT
Start: 2018-05-08 | End: 2018-05-08 | Stop reason: HOSPADM

## 2018-05-08 RX ORDER — FENTANYL CITRATE 50 UG/ML
200 INJECTION, SOLUTION INTRAMUSCULAR; INTRAVENOUS
Status: DISCONTINUED | OUTPATIENT
Start: 2018-05-08 | End: 2018-05-08 | Stop reason: HOSPADM

## 2018-05-08 RX ORDER — PROPOFOL 10 MG/ML
INJECTION, EMULSION INTRAVENOUS AS NEEDED
Status: DISCONTINUED | OUTPATIENT
Start: 2018-05-08 | End: 2018-05-08 | Stop reason: HOSPADM

## 2018-05-08 RX ORDER — SODIUM CHLORIDE 9 MG/ML
50 INJECTION, SOLUTION INTRAVENOUS CONTINUOUS
Status: DISCONTINUED | OUTPATIENT
Start: 2018-05-08 | End: 2018-05-08 | Stop reason: HOSPADM

## 2018-05-08 RX ORDER — DEXTROMETHORPHAN/PSEUDOEPHED 2.5-7.5/.8
1.2 DROPS ORAL
Status: DISCONTINUED | OUTPATIENT
Start: 2018-05-08 | End: 2018-05-08 | Stop reason: HOSPADM

## 2018-05-08 RX ORDER — ATROPINE SULFATE 0.1 MG/ML
0.5 INJECTION INTRAVENOUS
Status: DISCONTINUED | OUTPATIENT
Start: 2018-05-08 | End: 2018-05-08 | Stop reason: HOSPADM

## 2018-05-08 RX ORDER — SODIUM CHLORIDE 0.9 % (FLUSH) 0.9 %
5-10 SYRINGE (ML) INJECTION EVERY 8 HOURS
Status: DISCONTINUED | OUTPATIENT
Start: 2018-05-08 | End: 2018-05-08 | Stop reason: HOSPADM

## 2018-05-08 RX ORDER — SODIUM CHLORIDE 0.9 % (FLUSH) 0.9 %
5-10 SYRINGE (ML) INJECTION AS NEEDED
Status: DISCONTINUED | OUTPATIENT
Start: 2018-05-08 | End: 2018-05-08 | Stop reason: HOSPADM

## 2018-05-08 RX ORDER — EPINEPHRINE 0.1 MG/ML
1 INJECTION INTRACARDIAC; INTRAVENOUS
Status: DISCONTINUED | OUTPATIENT
Start: 2018-05-08 | End: 2018-05-08 | Stop reason: HOSPADM

## 2018-05-08 RX ADMIN — LIDOCAINE HYDROCHLORIDE 100 MG: 20 INJECTION, SOLUTION EPIDURAL; INFILTRATION; INTRACAUDAL; PERINEURAL at 12:46

## 2018-05-08 RX ADMIN — PROPOFOL 50 MG: 10 INJECTION, EMULSION INTRAVENOUS at 12:54

## 2018-05-08 RX ADMIN — SODIUM CHLORIDE: 9 INJECTION, SOLUTION INTRAVENOUS at 11:24

## 2018-05-08 RX ADMIN — PROPOFOL 30 MG: 10 INJECTION, EMULSION INTRAVENOUS at 13:01

## 2018-05-08 RX ADMIN — PROPOFOL 100 MG: 10 INJECTION, EMULSION INTRAVENOUS at 12:46

## 2018-05-08 NOTE — H&P
Pre-endoscopy H and P for Colonoscopy    The patient was seen and examined. Date of last colonoscopy: 2013, Polyps  Yes      The airway was assessed and documented. The problem list, past medical history, and medications were reviewed. Patient Active Problem List   Diagnosis Code    Asthma J45.909    Chronic prostatitis N41.1    History of colonoscopy Z98.890    Acquired hypothyroidism E03.9    Low vitamin D level E55.9    Obesity (BMI 30-39. 9) E66.9    ACP (advance care planning) Z71.89     Social History     Social History    Marital status:      Spouse name: N/A    Number of children: N/A    Years of education: N/A     Occupational History    Not on file. Social History Main Topics    Smoking status: Never Smoker    Smokeless tobacco: Never Used    Alcohol use Yes      Comment: occasional    Drug use: No    Sexual activity: Not on file     Other Topics Concern    Not on file     Social History Narrative     Past Medical History:   Diagnosis Date    Asthma 3/30/2010    Chronic prostatitis 3/30/2010    History of colonoscopy 3/30/2010    Hypothyroid 12/20/2011    Hypothyroidism     Prostate cancer ([facility] [address] 03/2013    Dr Warden Sanchez    Skin cancer      The patient has a family history of na    Prior to Admission Medications   Prescriptions Last Dose Informant Patient Reported? Taking? AMOXICILLIN PO 5/8/2018 at Unknown time  Yes Yes   Sig: Take 4 Tabs by mouth once. DENTA 5000 PLUS 1.1 % crea 5/7/2018 at Unknown time  Yes Yes   Sig: two (2) times a day. Silodosin (RAPAFLO) 8 mg Cap 5/7/2018 at Unknown time  Yes Yes   Sig: Take 8 mg by mouth daily. cholecalciferol (VITAMIN D3) 1,000 unit cap 5/7/2018 at Unknown time  Yes Yes   Sig: Take 1,000 Units by mouth daily. doxycycline (VIBRAMYCIN) 100 mg capsule 5/7/2018 at Unknown time  Yes Yes   Sig: Take 100 mg by mouth daily.    levothyroxine (SYNTHROID) 75 mcg tablet 5/7/2018 at Unknown time  No Yes   Sig: Take 1 Tab by mouth Daily (before breakfast). Facility-Administered Medications: None         The review of systems is:  negative for shortness of breath or chest pain      The heart, lungs and mental status were satisfactory for the administration of MAC sedation and for the procedure. Mallampati score: See Anesthesia. I discussed with the patient the objectives, risks, consequences and alternatives to the procedure. Plan: Endoscopic procedure with MAC sedation.     Marlyn Cordova MD  5/8/2018  12:42 PM

## 2018-05-08 NOTE — ANESTHESIA POSTPROCEDURE EVALUATION
Post-Anesthesia Evaluation and Assessment    Patient: Gordon Murray MRN: [id number]  SSN: [id number]    YOB: 1940  Age: 78 y.o. Sex: male       Cardiovascular Function/Vital Signs  Visit Vitals    /71    Pulse 71    Temp 36.6 °C (97.8 °F)    Resp 12    Ht 5' 9\" (1.753 m)    Wt 106.6 kg (235 lb 1 oz)    SpO2 96%    BMI 34.71 kg/m2       Patient is status post MAC anesthesia for Procedure(s):  COLONOSCOPY  ENDOSCOPIC POLYPECTOMY  INJECTION  RESOLUTION CLIP. Nausea/Vomiting: None    Postoperative hydration reviewed and adequate. Pain:  Pain Scale 1: Numeric (0 - 10) (05/08/18 1320)  Pain Intensity 1: 0 (05/08/18 1320)   Managed    Neurological Status: At baseline    Mental Status and Level of Consciousness: Arousable    Pulmonary Status:   O2 Device: Room air (05/08/18 1320)   Adequate oxygenation and airway patent    Complications related to anesthesia: None    Post-anesthesia assessment completed.  No concerns    Signed By: Kimberley Alcala MD     May 8, 2018

## 2018-05-08 NOTE — ANESTHESIA PREPROCEDURE EVALUATION
Anesthetic History               Review of Systems / Medical History  Patient summary reviewed, nursing notes reviewed and pertinent labs reviewed    Pulmonary            Asthma        Neuro/Psych              Cardiovascular                  Exercise tolerance: >4 METS     GI/Hepatic/Renal  Within defined limits              Endo/Other      Hypothyroidism  Obesity     Other Findings            Physical Exam    Airway  Mallampati: I  TM Distance: > 6 cm  Neck ROM: normal range of motion   Mouth opening: Normal     Cardiovascular    Rhythm: regular  Rate: normal         Dental  No notable dental hx       Pulmonary  Breath sounds clear to auscultation               Abdominal         Other Findings            Anesthetic Plan    ASA: 2  Anesthesia type: MAC          Induction: Intravenous  Anesthetic plan and risks discussed with: Patient

## 2018-05-08 NOTE — ROUTINE PROCESS
Frankie Richardson  1940  [id number]    Situation:  Verbal report received from: Anmol Reyes RN  Procedure: Procedure(s):  COLONOSCOPY  ENDOSCOPIC POLYPECTOMY  INJECTION  RESOLUTION CLIP    Background:    Preoperative diagnosis: OCCULT BLOOD POSITIVE  Postoperative diagnosis: moderate sigmoid diverticulosis, colon polyps    :  Dr. Michelle Spain  Assistant(s): Endoscopy Technician-1: Jluis Wise  Endoscopy RN-1: Yarely Alcantar RN    Specimens:   ID Type Source Tests Collected by Time Destination   1 : polyp Preservative Hepatic Flexure  Levora Runner, MD 5/8/2018 1303 Pathology   2 : polyp Preservative Sigmoid  Levora Runner, MD 5/8/2018 1304 Pathology     H. Pylori  no    Assessment:  Intra-procedure medications       Anesthesia gave intra-procedure sedation and medications, see anesthesia flow sheet yes    Intravenous fluids: NS@ KVO     Vital signs stable     Abdominal assessment: round and soft     Recommendation:  Discharge patient per MD order  Family or Friend Pt wife  Permission to share finding with family or friend yes

## 2018-05-08 NOTE — DISCHARGE INSTRUCTIONS
Larry Chandler  [id number]  1940    COLON DISCHARGE INSTRUCTIONS  Discomfort:  Redness at IV site- apply warm compress to area; if redness or soreness persist- contact your physician  There may be a slight amount of blood passed from the rectum  Gaseous discomfort- walking, belching will help relieve any discomfort  You may not operate a vehicle for 12 hours  You may not engage in an occupation involving machinery or appliances for rest of today  You may not drink alcoholic beverages for at least 12 hours  Avoid making any critical decisions for at least 24 hour  DIET:   Regular diet. - however -  remember your colon is empty and a heavy meal will produce gas. Avoid these foods:  vegetables, fried / greasy foods, carbonated drinks for today. ACTIVITY:  You may resume your normal daily activities it is recommended that you spend the remainder of the day resting -  avoid any strenuous activity. CALL M.D. ANY SIGN OF:  Increasing pain, nausea, vomiting  Abdominal distension (swelling)  New increased bleeding (oral or rectal)  Fever (chills)  Pain in chest area  Bloody discharge from nose or mouth  Shortness of breath    You may not  take any Advil, Aspirin, Ibuprofen, Motrin, Aleve, Goodys, or any similar pain or arthritis products for 10 days, ONLY  Tylenol as needed for pain.       Follow-up Instructions:   Call Dr. Autumn Ocampo  Results of procedure / biopsy in 10-14days   Office telephone for problems or questions [phone]    Recommendation for next colonscopy in 5 years  If < 10 years, reason:  above average risk patient

## 2018-05-08 NOTE — IP AVS SNAPSHOT
[address] [facility] [address] [place] [address] 
[phone] Patient: Joanna Bowling MRN: [id number] PDC:14/70/7868 About your hospitalization You were admitted on: May 8, 2018 You last received care in the:  [facility] ENDOSCOPY You were discharged on: May 8, 2018 Why you were hospitalized Your primary diagnosis was:  Not on File Follow-up Information None Discharge Orders None A check lesvia indicates which time of day the medication should be taken. My Medications CONTINUE taking these medications Instructions Each Dose to Equal  
 Morning Noon Evening Bedtime AMOXICILLIN PO Your last dose was: Your next dose is: Take 4 Tabs by mouth once. 4 Tab  
    
   
   
   
  
 cholecalciferol 1,000 unit Cap Commonly known as:  VITAMIN D3 Your last dose was: Your next dose is: Take 1,000 Units by mouth daily. DENTA 5000 PLUS 1.1 % Crea Generic drug:  fluoride (sodium) Your last dose was: Your next dose is:    
   
   
 two (2) times a day. doxycycline 100 mg capsule Commonly known as:  VIBRAMYCIN Your last dose was: Your next dose is: Take 100 mg by mouth daily. 100 mg  
    
   
   
   
  
 levothyroxine 75 mcg tablet Commonly known as:  SYNTHROID Your last dose was: Your next dose is: Take 1 Tab by mouth Daily (before breakfast). 75 mcg RAPAFLO 8 mg capsule Generic drug:  silodosin Your last dose was: Your next dose is: Take 8 mg by mouth daily. 8 mg Discharge Instructions Joanna Bowling [id number] 
1940 COLON DISCHARGE INSTRUCTIONS Discomfort: 
Redness at IV site- apply warm compress to area; if redness or soreness persist- contact your physician There may be a slight amount of blood passed from the rectum Gaseous discomfort- walking, belching will help relieve any discomfort You may not operate a vehicle for 12 hours You may not engage in an occupation involving machinery or appliances for rest of today You may not drink alcoholic beverages for at least 12 hours Avoid making any critical decisions for at least 24 hour DIET: 
 Regular diet.  however -  remember your colon is empty and a heavy meal will produce gas. Avoid these foods:  vegetables, fried / greasy foods, carbonated drinks for today. ACTIVITY: 
You may resume your normal daily activities it is recommended that you spend the remainder of the day resting -  avoid any strenuous activity. CALL M.D. ANY SIGN OF: Increasing pain, nausea, vomiting Abdominal distension (swelling) New increased bleeding (oral or rectal) Fever (chills) Pain in chest area Bloody discharge from nose or mouth Shortness of breath You may not  take any Advil, Aspirin, Ibuprofen, Motrin, Aleve, Goodys, or any similar pain or arthritis products for 10 days, ONLY  Tylenol as needed for pain. Follow-up Instructions: 
 Call Dr. An Sanchez Results of procedure / biopsy in 10-14days Office telephone for problems or questions [phone] Recommendation for next colonscopy in 5 years If < 10 years, reason:  above average risk patient ACO Transitions of Care Introducing [facility] [address] Sandrita Paez offers a voluntary care coordination program to provide high quality service and care to [facility] fee-for-service beneficiaries. Parker Jaramillo was designed to help you enhance your health and well-being through the following services: ? Transitions of Care  support for individuals who are transitioning from one care setting to another (example: Hospital to home). ? Chronic and Complex Care Coordination  support for individuals and caregivers of those with serious or chronic illnesses or with more than one chronic (ongoing) condition and those who take a number of different medications. If you meet specific medical criteria, a [facility] [address] may call you directly to coordinate your care with your primary care physician and your other care providers. For questions about the [facility] programs, please, contact your physicians office. For general questions or additional information about Accountable Care Organizations: 
Please visit www.medicare.gov/acos. html or call 1-800-MEDICARE (8-494.637.4981) TTY users should call 1-744.747.6840. Introducing [facility]! Azael Frankel introduces Edventory patient portal. Now you can access parts of your medical record, email your doctor's office, and request medication refills online. 1. In your internet browser, go to [email or web address] [email or web address] 2. Click on the First Time User? Click Here link in the Sign In box. You will see the New Member Sign Up page. 3. Enter your Edventory Access Code exactly as it appears below. You will not need to use this code after youve completed the sign-up process. If you do not sign up before the expiration date, you must request a new code. · Edventory Access Code: [email or web address] Expires: 8/6/2018  1:25 PM 
 
4. Enter the last four digits of your Social Security Number (xxxx) and Date of Birth (mm/dd/yyyy) as indicated and click Submit. You will be taken to the next sign-up page. 5. Create a SynapSenset ID. This will be your SynapSenset login ID and cannot be changed, so think of one that is secure and easy to remember. 6. Create a SynapSenset password. You can change your password at any time. 7. Enter your Password Reset Question and Answer. This can be used at a later time if you forget your password. 8. Enter your e-mail address. You will receive e-mail notification when new information is available in [address] 9. Click Sign Up. You can now view and download portions of your medical record. 10. Click the Download Summary menu link to download a portable copy of your medical information. If you have questions, please visit the Frequently Asked Questions section of the [employer] website. Remember, Echograph is NOT to be used for urgent needs. For medical emergencies, dial 911. Now available from your iPhone and Android! Introducing Nate Grier As a [facility]phanie Weller patient, I wanted to make you aware of our electronic visit tool called Nate Couchfin. iCouch 24/7 allows you to connect within minutes with a medical provider 24 hours a day, seven days a week via a mobile device or tablet or logging into a secure website from your computer. You can access Nate Grier from anywhere in the [place]. A virtual visit might be right for you when you have a simple condition and feel like you just dont want to get out of bed, or cant get away from work for an appointment, when your regular [facility] provider is not available (evenings, weekends or holidays), or when youre out of town and need minor care. Electronic visits cost only $49 and if the [facility] 24/7 provider determines a prescription is needed to treat your condition, one can be electronically transmitted to a nearby pharmacy*. Please take a moment to enroll today if you have not already done so. The enrollment process is free and takes just a few minutes. To enroll, please download the iCouch 24/[employer] danae to your tablet or phone, or visit [email or web address] to enroll on your computer.    
And, as an [address] [place] [address] patient with a CardioLogs account, the results of your visits will be scanned into your electronic medical record and your primary care provider will be able to view the scanned results. We urge you to continue to see your regular [employer] provider for your ongoing medical care. And while your primary care provider may not be the one available when you seek a Nate Grier virtual visit, the peace of mind you get from getting a real diagnosis real time can be priceless. For more information on Nate Alirezadaniella, view our Frequently Asked Questions (FAQs) at [email or web address]. Sincerely, 
 
Layne Machado MD 
Chief Medical Officer [facility] Sandrita Paez *:  certain medications cannot be prescribed via Nate Grier Providers Seen During Your Hospitalization Provider Specialty Primary office phone Dorian Latham MD Gastroenterology [phone] Your Primary Care Physician (PCP) Primary Care Physician Office Phone Office Fax Patricia MANTILLA [phone] ** None ** You are allergic to the following No active allergies Recent Documentation Height Weight BMI Smoking Status 1.753 m 106.6 kg 34.71 kg/m2 Never Smoker Emergency Contacts Name Discharge Info Relation Home Work Mobile Zay Kruse DISCHARGE CAREGIVER [3] Spouse [3] [phone] Patient Belongings The following personal items are in your possession at time of discharge: 
  Dental Appliances: None  Visual Aid: Glasses Please provide this summary of care documentation to your next provider. Signatures-by signing, you are acknowledging that this After Visit Summary has been reviewed with you and you have received a copy. Patient Signature:  ____________________________________________________________ Date:  ____________________________________________________________  
  
Rajani Cantrell Provider Signature:  ____________________________________________________________ Date:  ____________________________________________________________

## 2018-05-09 NOTE — PROCEDURES
Colonoscopy Procedure Note    Indications:   Personal history of colon polyps (screening only), contrary to prior hx no FHx of colon ca  Referring Physician: Errol Cruz NP   Anesthesia/Sedation:MAC  Endoscopist:  Dr. Lindsay Staton  Assistant:  Endoscopy Technician-1: Carli Hein  Endoscopy RN-1: Merlyn Posada RN    Preoperative diagnosis: OCCULT BLOOD POSITIVE    Postoperative diagnosis: moderate sigmoid diverticulosis, colon polyps      Procedure in Detail:  Informed consent was obtained for the procedure, including sedation. Risks of perforation, hemorrhage, adverse drug reaction, and aspiration were discussed. The patient was placed in the left lateral decubitus position. Based on the pre-procedure assessment, including review of the patient's medical history, medications, allergies, and review of systems, he had been deemed to be an appropriate candidate for moderate sedation; he was therefore sedated with the medications listed above. The patient was monitored continuously with ECG tracing, pulse oximetry, blood pressure monitoring, and direct observations. A rectal examination was performed. The [id number] was inserted into the rectum and advanced under direct vision to the cecum, which was identified by the ileocecal valve and appendiceal orifice. The quality of the colonic preparation was excellent. A careful inspection was made as the colonoscope was withdrawn, including a retroflexed view of the rectum; findings and interventions are described below.       Findings:   Rectum: normal  Sigmoid: moderate diverticulosis; two - 3 mm polyps removed with cold forceps  Descending Colon: mild diverticulosis;  Transverse Colon: normal  Ascending Colon: 5 mm hepatic flexure polyp raised from fold with saline pillow and removed with hot snare  Cecum: normal  Terminal Ileum: not intubated    Specimens:     see above    EBL: None    Complications: None; patient tolerated the procedure well. Recommendations:     - Await pathology. - Repeat colonoscopy in 5 years.      - If < 10 years, reason: above average risk patient    Signed By: Farzana Herrera MD                        May 9, 2018

## 2018-05-21 RX ORDER — LEVOTHYROXINE SODIUM 75 UG/1
TABLET ORAL
Qty: 90 TAB | Refills: 1 | Status: SHIPPED | OUTPATIENT
Start: 2018-05-21 | End: 2018-11-20 | Stop reason: SDUPTHER

## 2018-11-20 RX ORDER — LEVOTHYROXINE SODIUM 75 UG/1
TABLET ORAL
Qty: 90 TAB | Refills: 1 | Status: SHIPPED | OUTPATIENT
Start: 2018-11-20 | End: 2019-05-30 | Stop reason: SDUPTHER

## 2018-12-14 ENCOUNTER — HOSPITAL ENCOUNTER (OUTPATIENT)
Dept: LAB | Age: 78
Discharge: HOME | End: 2018-12-14
Payer: MEDICARE

## 2018-12-14 ENCOUNTER — OFFICE VISIT (OUTPATIENT)
Dept: FAMILY MEDICINE CLINIC | Age: 78
End: 2018-12-14

## 2018-12-14 VITALS
HEART RATE: 64 BPM | BODY MASS INDEX: 36.88 KG/M2 | RESPIRATION RATE: 16 BRPM | TEMPERATURE: 96 F | DIASTOLIC BLOOD PRESSURE: 72 MMHG | WEIGHT: 249 LBS | OXYGEN SATURATION: 96 % | SYSTOLIC BLOOD PRESSURE: 127 MMHG | HEIGHT: 69 IN

## 2018-12-14 DIAGNOSIS — Z00.00 ENCOUNTER FOR INITIAL PREVENTIVE PHYSICAL EXAMINATION COVERED BY MEDICARE: Primary | ICD-10-CM

## 2018-12-14 DIAGNOSIS — N41.1 CHRONIC PROSTATITIS: ICD-10-CM

## 2018-12-14 DIAGNOSIS — E66.9 OBESITY (BMI 30-39.9): ICD-10-CM

## 2018-12-14 DIAGNOSIS — E03.9 ACQUIRED HYPOTHYROIDISM: ICD-10-CM

## 2018-12-14 PROBLEM — E66.01 SEVERE OBESITY (HCC): Status: ACTIVE | Noted: 2018-12-14

## 2018-12-14 PROCEDURE — 80061 LIPID PANEL: CPT

## 2018-12-14 PROCEDURE — 84439 ASSAY OF FREE THYROXINE: CPT

## 2018-12-14 PROCEDURE — 84153 ASSAY OF PSA TOTAL: CPT

## 2018-12-14 PROCEDURE — 84443 ASSAY THYROID STIM HORMONE: CPT

## 2018-12-14 PROCEDURE — 85027 COMPLETE CBC AUTOMATED: CPT

## 2018-12-14 PROCEDURE — 80053 COMPREHEN METABOLIC PANEL: CPT

## 2018-12-14 PROCEDURE — 84154 ASSAY OF PSA FREE: CPT

## 2018-12-14 RX ORDER — TAMSULOSIN HYDROCHLORIDE 0.4 MG/1
CAPSULE ORAL
COMMUNITY
Start: 2018-11-06

## 2018-12-14 NOTE — PROGRESS NOTES
HISTORY OF PRESENT ILLNESS  Lord Arita is a 70 y.o. male. HPI: Following for yearly physical. Patient has history of chronic prostatitis, prostate cancer, hypothyroidism and obesity. He is followed by his urologist for Prostate issues. Past Medical History:   Diagnosis Date    Asthma 3/30/2010    Chronic prostatitis 3/30/2010    History of colonoscopy 3/30/2010    Hypothyroid 2011    Hypothyroidism     Prostate [facility] ([facility] 2013    Dr Guevara Justa Skin cancer      Past Surgical History:   Procedure Laterality Date    COLONOSCOPY N/A 2018    COLONOSCOPY performed by Aiden Thomas MD at [address] HX COLONOSCOPY  2013    polyps were found. Go back in 3 years.  HX ORTHOPAEDIC      left knee replacement    HX OTHER SURGICAL  early     mohs surgery   No Known Allergies    Current Outpatient Medications:     tamsulosin (FLOMAX) 0.4 mg capsule, , Disp: , Rfl:     varicella-zoster recombinant, PF, (SHINGRIX, PF,) 50 mcg/0.5 mL susr injection, 0.5 mL by IntraMUSCular route once for 1 dose. Get the second vaccine in 2 month, Disp: 0.5 mL, Rfl: 1    levothyroxine (SYNTHROID) 75 mcg tablet, TAKE 1 TABLET BY MOUTH ONCE DAILY BEFORE BREAKFAST, Disp: 90 Tab, Rfl: 1    cholecalciferol (VITAMIN D3) 1,000 unit cap, Take 1,000 Units by mouth daily. , Disp: , Rfl:     doxycycline (VIBRAMYCIN) 100 mg capsule, Take 100 mg by mouth daily. , Disp: , Rfl:     DENTA 5000 PLUS 1.1 % crea, two (2) times a day., Disp: , Rfl:     AMOXICILLIN PO, Take 4 Tabs by mouth once., Disp: , Rfl:     Silodosin (RAPAFLO) 8 mg Cap, Take 8 mg by mouth daily. , Disp: , Rfl:   Review of Systems   Constitutional: Negative. Respiratory: Negative. Cardiovascular: Negative. Gastrointestinal: Negative. Genitourinary: Negative. Blood pressure 127/72, pulse 64, temperature 96 °F (35.6 °C), temperature source Oral, resp.  rate 16, height 5' 9\" (1.753 m), weight 249 lb (112.9 kg), SpO2 96 %.    Body mass index is 36.77 kg/m². Physical Exam   Constitutional: No distress. HENT:   Mouth/Throat: Oropharynx is clear and moist.   Neck: Normal range of motion. Neck supple. Cardiovascular: Normal rate and regular rhythm. No murmur heard. Pulmonary/Chest: Effort normal and breath sounds normal.   Abdominal: Soft. Bowel sounds are normal.   Nursing note and vitals reviewed. ASSESSMENT and PLAN  Diagnoses and all orders for this visit:    1. Encounter for initial preventive physical examination covered by Medicare  -     CBC W/O DIFF  -     METABOLIC PANEL, COMPREHENSIVE  -     LIPID PANEL    2. Acquired hypothyroidism  -     TSH 3RD GENERATION  -     T4, FREE    3. Obesity (BMI 30-39. 9)      Normal BMI discussed, advised to watch diet and exercise  4. Chronic prostatitis  -     PSA W/ REFLX FREE PSA    Other orders  -     varicella-zoster recombinant, PF, (SHINGRIX, PF,) 50 mcg/0.5 mL susr injection; 0.5 mL by IntraMUSCular route once for 1 dose.  Get the second vaccine in 2 month

## 2018-12-14 NOTE — PATIENT INSTRUCTIONS
Medicare Wellness Visit, Male    The best way to live healthy is to have a lifestyle where you eat a well-balanced diet, exercise regularly, limit alcohol use, and quit all forms of tobacco/nicotine, if applicable. Regular preventive services are another way to keep healthy. Preventive services (vaccines, screening tests, monitoring & exams) can help personalize your care plan, which helps you manage your own care. Screening tests can find health problems at the earliest stages, when they are easiest to treat. [address] [facility] follows the current, evidence-based guidelines published by the [facility] Hernan Malka ([facility]) when recommending preventive services for our patients. Because we follow these guidelines, sometimes recommendations change over time as research supports it. (For example, a prostate screening blood test is no longer routinely recommended for men with no symptoms.)  Of course, you and your doctor may decide to screen more often for some diseases, based on your risk and co-morbidities (chronic disease you are already diagnosed with). Preventive services for you include:  - Medicare offers their members a free annual wellness visit, which is time for you and your primary care provider to discuss and plan for your preventive service needs. Take advantage of this benefit every year!  -All adults over age 72 should receive the recommended pneumonia vaccines. Current USPSTF guidelines recommend a series of two vaccines for the best pneumonia protection.   -All adults should have a flu vaccine yearly and an ECG.  All adults age 61 and older should receive a shingles vaccine once in their lifetime.    -All adults age 38-68 who are overweight should have a diabetes screening test once every three years.   -Other screening tests & preventive services for persons with diabetes include: an eye exam to screen for diabetic retinopathy, a kidney function test, a foot exam, and stricter control over your cholesterol.   -Cardiovascular screening for adults with routine risk involves an electrocardiogram (ECG) at intervals determined by the provider.   -Colorectal cancer screening should be done for adults age 54-65 with no increased risk factors for colorectal cancer. There are a number of acceptable methods of screening for this type of cancer. Each test has its own benefits and drawbacks. Discuss with your provider what is most appropriate for you during your annual wellness visit. The different tests include: colonoscopy (considered the best screening method), a fecal occult blood test, a fecal DNA test, and sigmoidoscopy.  -All adults born between [facility] should be screened once for Hepatitis C.  -An Abdominal Aortic Aneurysm (AAA) Screening is recommended for men age 73-68 who has ever smoked in their lifetime. Here is a list of your current Health Maintenance items (your personalized list of preventive services) with a due date: There are no preventive care reminders to display for this patient.

## 2018-12-14 NOTE — PROGRESS NOTES
Shiva Lanza is a 70 y.o. male     Chief Complaint   Patient presents with   [facility] Annual Wellness Visit       Visit Vitals  /72 (BP 1 Location: Left arm, BP Patient Position: Sitting)   Pulse 64   Temp 96 °F (35.6 °C) (Oral)   Resp 16   Ht 5' 9\" (1.753 m)   Wt 249 lb (112.9 kg)   SpO2 96%   BMI 36.77 kg/m²       Health Maintenance Due   Topic Date Due    Shingrix Vaccine Age 50> (1 of 2) 11/18/1992    MEDICARE YEARLY EXAM  12/13/2018       1. Have you been to the ER, urgent care clinic since your last visit? Hospitalized since your last visit? No    2. Have you seen or consulted any other health care providers outside of the [address] [place] [address] since your last visit? Include any pap smears or colon screening.  No

## 2018-12-14 NOTE — PROGRESS NOTES
This is the Subsequent Medicare Annual Wellness Exam, performed 12 months or more after the Initial AWV or the last Subsequent AWV    I have reviewed the patient's medical history in detail and updated the computerized patient record. History     Past Medical History:   Diagnosis Date    Asthma 3/30/2010    Chronic prostatitis 3/30/2010    History of colonoscopy 3/30/2010    Hypothyroid 12/20/2011    Hypothyroidism     Prostate cancer ([address] 03/2013    Dr Laya Carlin Skin cancer       Past Surgical History:   Procedure Laterality Date    COLONOSCOPY N/A 5/8/2018    COLONOSCOPY performed by Bill John MD at [facility] HX COLONOSCOPY  June 2013    polyps were found. Go back in 3 years.  HX ORTHOPAEDIC      left knee replacement    HX OTHER SURGICAL  early 2015    mohs surgery     Current Outpatient Medications   Medication Sig Dispense Refill    tamsulosin (FLOMAX) 0.4 mg capsule       varicella-zoster recombinant, PF, (SHINGRIX, PF,) 50 mcg/0.5 mL susr injection 0.5 mL by IntraMUSCular route once for 1 dose. Get the second vaccine in 2 month 0.5 mL 1    levothyroxine (SYNTHROID) 75 mcg tablet TAKE 1 TABLET BY MOUTH ONCE DAILY BEFORE BREAKFAST 90 Tab 1    cholecalciferol (VITAMIN D3) 1,000 unit cap Take 1,000 Units by mouth daily.  doxycycline (VIBRAMYCIN) 100 mg capsule Take 100 mg by mouth daily.  DENTA 5000 PLUS 1.1 % crea two (2) times a day.  AMOXICILLIN PO Take 4 Tabs by mouth once.  Silodosin (RAPAFLO) 8 mg Cap Take 8 mg by mouth daily.        No Known Allergies  Family History   Problem Relation Age of Onset    Diabetes Mother     Blindness Mother         due to diabetes    Kidney Disease Father     Cancer Father     Heart Surgery Father     Coronary Artery Disease Father     Cancer Sister     Diabetes Brother     Breast Cancer Sister     Alcohol abuse Daughter      Social History     Tobacco Use    Smoking status: Never Smoker    Smokeless tobacco: Never Used   Substance Use Topics    Alcohol use: Yes     Comment: occasional     Patient Active Problem List   Diagnosis Code    Asthma J45.909    Chronic prostatitis N41.1    History of colonoscopy Z98.890    Acquired hypothyroidism E03.9    Low vitamin D level R79.89    Obesity (BMI 30-39. 9) E66.9    ACP (advance care planning) Z71.89    Severe obesity ([address] E66.01       Depression Risk Factor Screening:     PHQ over the last two weeks 12/14/2018   Little interest or pleasure in doing things Not at all   Feeling down, depressed, irritable, or hopeless Not at all   Total Score PHQ 2 0     Alcohol Risk Factor Screening: On any occasion in the past three months you have had more than 7 drinks containing alcohol    Functional Ability and Level of Safety:   Hearing Loss  Hearing is good. Activities of Daily Living  The home contains: handrails  Patient does total self care    Fall Risk  Fall Risk Assessment, last 12 mths 12/14/2018   Able to walk? Yes   Fall in past 12 months? No       Abuse Screen  Patient is not abused    Cognitive Screening   Evaluation of Cognitive Function:  Has your family/caregiver stated any concerns about your memory: no  Normal    Patient Care Team   Patient Care Team:  Rivera Smith NP as PCP - General (Family Practice)  Zena Coy MD (Cardiology)    Assessment/Plan   Education and counseling provided:  Are appropriate based on today's review and evaluation    Diagnoses and all orders for this visit:    1. Encounter for initial preventive physical examination covered by Medicare  -     CBC W/O DIFF  -     METABOLIC PANEL, COMPREHENSIVE  -     LIPID PANEL    2. Acquired hypothyroidism  -     TSH 3RD GENERATION  -     T4, FREE    3. Obesity (BMI 30-39.9)    4. Chronic prostatitis  -     PSA W/ REFLX FREE PSA    Other orders  -     varicella-zoster recombinant, PF, (SHINGRIX, PF,) 50 mcg/0.5 mL susr injection; 0.5 mL by IntraMUSCular route once for 1 dose.  Get the second vaccine in 2 month        There are no preventive care reminders to display for this patient.

## 2018-12-15 LAB
ALBUMIN SERPL-MCNC: 4.4 G/DL (ref 3.5–4.8)
ALBUMIN/GLOB SERPL: 2.1 {RATIO} (ref 1.2–2.2)
ALP SERPL-CCNC: 56 IU/L (ref 39–117)
ALT SERPL-CCNC: 15 IU/L (ref 0–44)
AST SERPL-CCNC: 29 IU/L (ref 0–40)
BILIRUB SERPL-MCNC: 1 MG/DL (ref 0–1.2)
BUN SERPL-MCNC: 17 MG/DL (ref 8–27)
BUN/CREAT SERPL: 17 (ref 10–24)
CALCIUM SERPL-MCNC: 9.3 MG/DL (ref 8.6–10.2)
CHLORIDE SERPL-SCNC: 105 MMOL/L (ref 96–106)
CHOLEST SERPL-MCNC: 187 MG/DL (ref 100–199)
CO2 SERPL-SCNC: 26 MMOL/L (ref 20–29)
CREAT SERPL-MCNC: 1 MG/DL (ref 0.76–1.27)
ERYTHROCYTE [DISTWIDTH] IN BLOOD BY AUTOMATED COUNT: 13.6 % (ref 12.3–15.4)
GLOBULIN SER CALC-MCNC: 2.1 G/DL (ref 1.5–4.5)
GLUCOSE SERPL-MCNC: 98 MG/DL (ref 65–99)
HCT VFR BLD AUTO: 42.4 % (ref 37.5–51)
HDLC SERPL-MCNC: 65 MG/DL
HGB BLD-MCNC: 13.8 G/DL (ref 13–17.7)
INTERPRETATION, 910389: NORMAL
LDLC SERPL CALC-MCNC: 103 MG/DL (ref 0–99)
MCH RBC QN AUTO: 31.7 PG (ref 26.6–33)
MCHC RBC AUTO-ENTMCNC: 32.5 G/DL (ref 31.5–35.7)
MCV RBC AUTO: 97 FL (ref 79–97)
PLATELET # BLD AUTO: 170 X10E3/UL (ref 150–379)
POTASSIUM SERPL-SCNC: 4.4 MMOL/L (ref 3.5–5.2)
PROT SERPL-MCNC: 6.5 G/DL (ref 6–8.5)
PSA FREE MFR SERPL: 25.2 %
PSA FREE SERPL-MCNC: 2.22 NG/ML
PSA SERPL-MCNC: 8.8 NG/ML (ref 0–4)
RBC # BLD AUTO: 4.36 X10E6/UL (ref 4.14–5.8)
REFLEX CRITERIA: ABNORMAL
SODIUM SERPL-SCNC: 144 MMOL/L (ref 134–144)
T4 FREE SERPL-MCNC: 1.19 NG/DL (ref 0.82–1.77)
TRIGL SERPL-MCNC: 95 MG/DL (ref 0–149)
TSH SERPL DL<=0.005 MIU/L-ACNC: 2.22 UIU/ML (ref 0.45–4.5)
VLDLC SERPL CALC-MCNC: 19 MG/DL (ref 5–40)
WBC # BLD AUTO: 6.8 X10E3/UL (ref 3.4–10.8)

## 2019-03-19 ENCOUNTER — HOSPITAL ENCOUNTER (EMERGENCY)
Age: 79
Discharge: HOME | End: 2019-03-19
Attending: EMERGENCY MEDICINE
Payer: MEDICARE

## 2019-03-19 ENCOUNTER — APPOINTMENT (OUTPATIENT)
Dept: GENERAL RADIOLOGY | Age: 79
End: 2019-03-19
Attending: EMERGENCY MEDICINE
Payer: MEDICARE

## 2019-03-19 VITALS
BODY MASS INDEX: 37.03 KG/M2 | HEART RATE: 70 BPM | DIASTOLIC BLOOD PRESSURE: 72 MMHG | OXYGEN SATURATION: 98 % | RESPIRATION RATE: 18 BRPM | SYSTOLIC BLOOD PRESSURE: 148 MMHG | HEIGHT: 69 IN | WEIGHT: 250 LBS | TEMPERATURE: 97.6 F

## 2019-03-19 DIAGNOSIS — S90.111A CONTUSION OF RIGHT GREAT TOE WITHOUT DAMAGE TO NAIL, INITIAL ENCOUNTER: ICD-10-CM

## 2019-03-19 DIAGNOSIS — M25.561 ACUTE PAIN OF RIGHT KNEE: ICD-10-CM
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
## 2019-03-19 PROCEDURE — 73562 X-RAY EXAM OF KNEE 3: CPT

## 2019-03-19 PROCEDURE — 99283 EMERGENCY DEPT VISIT LOW MDM: CPT

## 2019-03-19 PROCEDURE — 73630 X-RAY EXAM OF FOOT: CPT

## 2019-03-19 NOTE — ED NOTES
Discharge instructions given to patient by PA. Pt has been given counseling on medication use and verbalizes understanding. IV d/c. Pt instructed to rest, elevate leg and follow up with specialist if pain worsens or persists. Pt ambulated off of unit in no signs of distress.

## 2019-03-19 NOTE — DISCHARGE INSTRUCTIONS
Patient Education        Preventing Falls: Care Instructions  Your Care Instructions    Getting around your home safely can be a challenge if you have injuries or health problems that make it easy for you to fall. Loose rugs and furniture in walkways are among the dangers for many older people who have problems walking or who have poor eyesight. People who have conditions such as arthritis, osteoporosis, or dementia also have to be careful not to fall. You can make your home safer with a few simple measures. Follow-up care is a key part of your treatment and safety. Be sure to make and go to all appointments, and call your doctor if you are having problems. It's also a good idea to know your test results and keep a list of the medicines you take. How can you care for yourself at home? Taking care of yourself  · You may get dizzy if you do not drink enough water. To prevent dehydration, drink plenty of fluids, enough so that your urine is light yellow or clear like water. Choose water and other caffeine-free clear liquids. If you have kidney, heart, or liver disease and have to limit fluids, talk with your doctor before you increase the amount of fluids you drink. · Exercise regularly to improve your strength, muscle tone, and balance. Walk if you can. Swimming may be a good choice if you cannot walk easily. · Have your vision and hearing checked each year or any time you notice a change. If you have trouble seeing and hearing, you might not be able to avoid objects and could lose your balance. · Know the side effects of the medicines you take. Ask your doctor or pharmacist whether the medicines you take can affect your balance. Sleeping pills or sedatives can affect your balance. · Limit the amount of alcohol you drink. Alcohol can impair your balance and other senses. · Ask your doctor whether calluses or corns on your feet need to be removed.  If you wear loose-fitting shoes because of calluses or corns, you can lose your balance and fall. · Talk to your doctor if you have numbness in your feet. Preventing falls at home  · Remove raised doorway thresholds, throw rugs, and clutter. Repair loose carpet or raised areas in the floor. · Move furniture and electrical cords to keep them out of walking paths. · Use nonskid floor wax, and wipe up spills right away, especially on ceramic tile floors. · If you use a walker or cane, put rubber tips on it. If you use crutches, clean the bottoms of them regularly with an abrasive pad, such as steel wool. · Keep your house well lit, especially [place], and outside walkways. Use night-lights in areas such as hallways and bathrooms. Add extra light switches or use remote switches (such as switches that go on or off when you clap your hands) to make it easier to turn lights on if you have to get up during the night. · Install sturdy handrails on stairways. · Move items in your cabinets so that the things you use a lot are on the lower shelves (about waist level). · Keep a cordless phone and a flashlight with new batteries by your bed. If possible, put a phone in each of the main rooms of your house, or carry a cell phone in case you fall and cannot reach a phone. Or, you can wear a device around your neck or wrist. You push a button that sends a signal for help. · Wear low-heeled shoes that fit well and give your feet good support. Use footwear with nonskid soles. Check the heels and soles of your shoes for wear. Repair or replace worn heels or soles. · Do not wear socks without shoes on wood floors. · Walk on the grass when the sidewalks are slippery. If you live in an area that gets snow and ice in the winter, sprinkle salt on slippery steps and sidewalks. Preventing falls in the bath  · Install grab bars and nonskid mats inside and outside your shower or tub and near the toilet and sinks. · Use shower chairs and bath benches.   · Use a hand-held shower head that will allow you to sit while showering. · Get into a tub or shower by putting the weaker leg in first. Get out of a tub or shower with your strong side first.  · Repair loose toilet seats and consider installing a raised toilet seat to make getting on and off the toilet easier. · Keep your bathroom door unlocked while you are in the shower. Where can you learn more? Go to http://pam-shelton.info/. Enter [id number] [id number] in the search box to learn more about \"Preventing Falls: Care Instructions. \"  Current as of: March 16, 2018  Content Version: 11.8  © 2569-0931 [employer]. Care instructions adapted under license by [employer] (which disclaims liability or warranty for this information). If you have questions about a medical condition or this instruction, always ask your healthcare professional. [place] [address] any warranty or liability for your use of this information. Patient Education        Knee Pain or Injury: Care Instructions  Your Care Instructions    Injuries are a common cause of knee problems. Sudden (acute) injuries may be caused by a direct blow to the knee. They can also be caused by abnormal twisting, bending, or falling on the knee. Pain, bruising, or swelling may be severe, and may start within minutes of the injury. Overuse is another cause of knee pain. Other causes are climbing stairs, kneeling, and other activities that use the knee. Everyday wear and tear, especially as you get older, also can cause knee pain. Rest, along with home treatment, often relieves pain and allows your knee to heal. If you have a serious knee injury, you may need tests and treatment. Follow-up care is a key part of your treatment and safety. Be sure to make and go to all appointments, and call your doctor if you are having problems. It's also a good idea to know your test results and keep a list of the medicines you take.   How can you care for yourself at home? · Be safe with medicines. Read and follow all instructions on the label. ? If the doctor gave you a prescription medicine for pain, take it as prescribed. ? If you are not taking a prescription pain medicine, ask your doctor if you can take an over-the-counter medicine. · Rest and protect your knee. Take a break from any activity that may cause pain. · Put ice or a cold pack on your knee for 10 to 20 minutes at a time. Put a thin cloth between the ice and your skin. · Prop up a sore knee on a pillow when you ice it or anytime you sit or lie down for the next 3 days. Try to keep it above the level of your heart. This will help reduce swelling. · If your knee is not swollen, you can put moist heat, a heating pad, or a warm cloth on your knee. · If your doctor recommends an elastic bandage, sleeve, or other type of support for your knee, wear it as directed. · Follow your doctor's instructions about how much weight you can put on your leg. Use a cane, crutches, or a walker as instructed. · Follow your doctor's instructions about activity during your healing process. If you can do mild exercise, slowly increase your activity. · Reach and stay at a healthy weight. Extra weight can strain the joints, especially the knees and hips, and make the pain worse. Losing even a few pounds may help. When should you call for help? Call 911 anytime you think you may need emergency care. For example, call if:    · You have symptoms of a blood clot in your lung (called a pulmonary embolism). These may include:  ? Sudden chest pain. ? Trouble breathing. ?  Coughing up blood.    Call your doctor now or seek immediate medical care if:    · You have severe or increasing pain.     · Your leg or foot turns cold or changes color.     · You cannot stand or put weight on your knee.     · Your knee looks twisted or bent out of shape.     · You cannot move your knee.     · You have signs of infection, such as:  ? Increased pain, swelling, warmth, or redness. ? Red streaks leading from the knee. ? Pus draining from a place on your knee. ? A fever.     · You have signs of a blood clot in your leg (called a deep vein thrombosis), such as:  ? Pain in your calf, back of the knee, thigh, or groin. ? Redness and swelling in your leg or groin.    Watch closely for changes in your health, and be sure to contact your doctor if:    · You have tingling, weakness, or numbness in your knee.     · You have any new symptoms, such as swelling.     · You have bruises from a knee injury that last longer than 2 weeks.     · You do not get better as expected. Where can you learn more? Go to http://pam-shelton.info/. Enter K195 in the search box to learn more about \"Knee Pain or Injury: Care Instructions. \"  Current as of: September 23, 2018  Content Version: 11.9  © 4534-1281 [employer]. Care instructions adapted under license by [employer] (which disclaims liability or warranty for this information). If you have questions about a medical condition or this instruction, always ask your healthcare professional. [place] [address] any warranty or liability for your use of this information. Patient Education        Foot Pain: Care Instructions  Your Care Instructions  Foot injuries that cause pain and swelling are fairly common. Almost all sports or home repair projects can cause a misstep that ends up as foot pain. Normal wear and tear, especially as you get older, also can cause foot pain. Most minor foot injuries will heal on their own, and home treatment is usually all you need to do. If you have a severe injury, you may need tests and treatment. Follow-up care is a key part of your treatment and safety. Be sure to make and go to all appointments, and call your doctor if you are having problems.  It's also a good idea to know your test results and keep a list of the medicines you take. How can you care for yourself at home? · Take pain medicines exactly as directed. ? If the doctor gave you a prescription medicine for pain, take it as prescribed. ? If you are not taking a prescription pain medicine, ask your doctor if you can take an over-the-counter medicine. · Rest and protect your foot. Take a break from any activity that may cause pain. · Put ice or a cold pack on your foot for 10 to 20 minutes at a time. Put a thin cloth between the ice and your skin. · Prop up the sore foot on a pillow when you ice it or anytime you sit or lie down during the next 3 days. Try to keep it above the level of your heart. This will help reduce swelling. · Your doctor may recommend that you wrap your foot with an elastic bandage. Keep your foot wrapped for as long as your doctor advises. · If your doctor recommends crutches, use them as directed. · Wear roomy footwear. · As soon as pain and swelling end, begin gentle exercises of your foot. Your doctor can tell you which exercises will help. When should you call for help? Call 911 anytime you think you may need emergency care. For example, call if:    · Your foot turns pale, white, blue, or cold.    Call your doctor now or seek immediate medical care if:    · You cannot move or stand on your foot.     · Your foot looks twisted or out of its normal position.     · Your foot is not stable when you step down.     · You have signs of infection, such as:  ? Increased pain, swelling, warmth, or redness. ? Red streaks leading from the sore area. ? Pus draining from a place on your foot. ? A fever.     · Your foot is numb or tingly.    Watch closely for changes in your health, and be sure to contact your doctor if:    · You do not get better as expected.     · You have bruises from an injury that last longer than 2 weeks. Where can you learn more? Go to http://paul.info/.   Enter Y237 in the search box to learn more about \"Foot Pain: Care Instructions. \"  Current as of: September 20, 2018  Content Version: 11.9  © 4022-9435 [employer]. Care instructions adapted under license by [employer] (which disclaims liability or warranty for this information). If you have questions about a medical condition or this instruction, always ask your healthcare professional. [address] any warranty or liability for your use of this information.

## 2019-03-19 NOTE — ED PROVIDER NOTES
HPI Pt states that he slipped and fell in his kitchen yesterday morning on wet alonzo. He denies any head or neck injury. He reports right knee and right foot pain. Skin integrity is intact. There is no obvious deformity. Good neurovascular sensation. No obvious joint effusion or joint instability. Pain increases with weight bearing; flexion and extension. He has not had any medications today prior to arrival. 
 
 
Past Medical History:  
Diagnosis Date  Asthma 3/30/2010  Chronic prostatitis 3/30/2010  
 History of colonoscopy 3/30/2010  Hypothyroid 12/20/2011  Hypothyroidism  Prostate cancer ([facility] [address] 03/2013 Dr Cindi Leger Past Surgical History:  
Procedure Laterality Date  COLONOSCOPY N/A 5/8/2018 COLONOSCOPY performed by Evelia Alexandre MD at [facility] HX COLONOSCOPY  June 2013  
 polyps were found. Go back in 3 years.  HX ORTHOPAEDIC    
 left knee replacement  HX OTHER SURGICAL  early 2015  
 mohs surgery Family History:  
Problem Relation Age of Onset  Diabetes Mother  Blindness Mother   
     due to diabetes  Kidney Disease Father  Cancer Father  Heart Surgery Father  Coronary Artery Disease Father  Cancer Sister  Diabetes Brother  Breast Cancer Sister  Alcohol abuse Daughter Social History Socioeconomic History  Marital status:  Spouse name: Not on file  Number of children: Not on file  Years of education: Not on file  Highest education level: Not on file Social Needs  Financial resource strain: Not on file  Food insecurity - worry: Not on file  Food insecurity - inability: Not on file  Transportation needs - medical: Not on file  Transportation needs - non-medical: Not on file Occupational History  Not on file Tobacco Use  Smoking status: Never Smoker  Smokeless tobacco: Never Used Substance and Sexual Activity  Alcohol use:  Yes  
 Comment: occasional  
 Drug use: No  
 Sexual activity: Not on file Other Topics Concern  Not on file Social History Narrative  Not on file ALLERGIES: Patient has no known allergies. Review of Systems Constitutional: Negative for activity change, appetite change, fever and unexpected weight change. HENT: Negative for congestion, facial swelling and trouble swallowing. Respiratory: Negative for cough and shortness of breath. Cardiovascular: Negative for chest pain, palpitations and leg swelling. Gastrointestinal: Negative for abdominal pain, diarrhea, nausea and vomiting. Musculoskeletal: Positive for arthralgias, gait problem and joint swelling. Skin: Negative for rash and wound. All other systems reviewed and are negative. Vitals:  
 03/19/19 1108 Pulse: 84 SpO2: 98% Physical Exam  
Constitutional: He is oriented to person, place, and time. Obese white elderly male; ; non smoker HENT:  
Right Ear: External ear normal.  
Left Ear: External ear normal.  
Mouth/Throat: Oropharynx is clear and moist.  
Neck: Normal range of motion. Neck supple. Cardiovascular: Normal rate and regular rhythm. Pulmonary/Chest: Effort normal and breath sounds normal.  
Abdominal: Soft. Bowel sounds are normal.  
Musculoskeletal: Normal range of motion. He exhibits edema and tenderness. He exhibits no deformity. Reports right knee pain; Skin integrity is intact. There is no obvious deformity. Good neurovascular sensation. No obvious joint effusion or joint instability. Pain increases with weight bearing; flexion and extension. Report right foot pain; Skin integrity is intact. There is obvious swelling particular adjacent to the right great toe;  no obvious deformity. Good neurovascular sensation. No apparent tendon or nerve injury. Lymphadenopathy:  
  He has no cervical adenopathy. Neurological: He is alert and oriented to person, place, and time. Skin: Skin is warm and dry. Psychiatric: He has a normal mood and affect. Nursing note and vitals reviewed. MDM Procedures Patient's results and plan of care have been reviewed with him. Patient and/or family have verbally conveyed their understanding and agreement of the patient's signs, symptoms, diagnosis, treatment and prognosis and additionally agree to follow up as recommended or return to the Emergency Room should his condition change prior to follow-up. Discharge instructions have also been provided to the patient with some educational information regarding his diagnosis as well a list of reasons why he would want to return to the ER prior to his follow-up appointment should his condition change. Nam Sweeney NP

## 2019-03-19 NOTE — ED TRIAGE NOTES
Pt reports carrying a large bag of ice and tripping yesterday morning. Pt c/o right big toe pain, right foot pain, and right knee.

## 2019-03-27 ENCOUNTER — OFFICE VISIT (OUTPATIENT)
Dept: FAMILY MEDICINE CLINIC | Age: 79
End: 2019-03-27

## 2019-03-27 VITALS
OXYGEN SATURATION: 99 % | WEIGHT: 251.6 LBS | HEIGHT: 69 IN | DIASTOLIC BLOOD PRESSURE: 66 MMHG | HEART RATE: 80 BPM | RESPIRATION RATE: 18 BRPM | SYSTOLIC BLOOD PRESSURE: 128 MMHG | BODY MASS INDEX: 37.26 KG/M2 | TEMPERATURE: 98.4 F

## 2019-03-27 DIAGNOSIS — S99.921A INJURY OF RIGHT FOOT, INITIAL ENCOUNTER: Primary | ICD-10-CM

## 2019-03-27 NOTE — PATIENT INSTRUCTIONS
Foot Sprain: Care Instructions  Your Care Instructions    A foot sprain occurs when you stretch or tear the ligaments around your foot. Ligaments are the tough tissues that connect one bone to another. A sprain can happen when you run, fall, or hit your toe against something. Sprains often happen when you jump or change direction quickly. This may occur when you play basketball, soccer, or other sports. Most foot sprains will get better with treatment at home. Follow-up care is a key part of your treatment and safety. Be sure to make and go to all appointments, and call your doctor if you are having problems. It's also a good idea to know your test results and keep a list of the medicines you take. How can you care for yourself at home? · Walk or put weight on your sprained foot as long as it does not hurt. · If your doctor gave you a splint or immobilizer, wear it as directed. If you were given crutches, use them as directed. · For the first 2 days after your injury, avoid hot showers, hot tubs, or hot packs. They may increase swelling. · Put ice or a cold pack on your foot for 10 to 20 minutes at a time to stop swelling. Try this every 1 to 2 hours for 3 days (when you are awake) or until the swelling goes down. Put a thin cloth between the ice pack and your skin. Keep your splint dry. · After 2 or 3 days, if your swelling is gone, put a heating pad (set on low) or a warm cloth on your foot. Some doctors suggest that you go back and forth between hot and cold treatments. · Prop up your foot on a pillow when you ice it or anytime you sit or lie down. Try to keep it above the level of your heart. This will help reduce swelling. · Take pain medicines exactly as directed. ? If the doctor gave you a prescription medicine for pain, take it as prescribed. ? If you are not taking a prescription pain medicine, ask your doctor if you can take an over-the-counter medicine.   · Do any exercises that your doctor or physical therapist suggests. · Return to your usual exercise gradually as you feel better. When should you call for help? Call your doctor now or seek immediate medical care if:    · You have increased or severe pain.     · Your toes are cool or pale or change color.     · Your wrap or splint feels too tight.     · You have signs of a blood clot, such as:  ? Pain in your calf, back of the knee, thigh, or groin. ? Redness and swelling in your leg or groin.     · You have tingling, weakness, or numbness in your leg or foot.    Watch closely for changes in your health, and be sure to contact your doctor if:    · You cannot put any weight on your foot.     · You get a fever.     · You do not get better as expected. Where can you learn more? Go to http://pam-shelton.info/. Enter I847 in the search box to learn more about \"Foot Sprain: Care Instructions. \"  Current as of: September 20, 2018  Content Version: 11.9  © 6900-4017 [employer]. Care instructions adapted under license by [employer] (which disclaims liability or warranty for this information). If you have questions about a medical condition or this instruction, always ask your healthcare professional. [address] any warranty or liability for your use of this information.

## 2019-03-27 NOTE — PROGRESS NOTES
Chief Complaint   Patient presents with    Foot Pain     fell 9 days ago, rt foot injury, swollen , great toe bruised       Reviewed Record in preparation for visit and have obtained necessary documentation. Identified pt with two pt identifiers (Name @ )    There are no preventive care reminders to display for this patient. 1. Have you been to the ER, urgent care clinic since your last visit? Hospitalized since your last visit? Went to [facility] FOR FOOT INJURY    2. Have you seen or consulted any other health care providers outside of the [id number] [place] [address] Philippe since your last visit? Include any pap smears or colon screening.  NO

## 2019-03-27 NOTE — LETTER
3/27/2019 11:52 AM 
 
Mr. Josefa Russ [address] [id number] To Whom It May Concern: 
 
Josefa Russ is currently under the care of [facility]. Please make accommodations for transportation within terminal for patient given right foot injury. If there are questions or concerns please have the patient contact our office. Sincerely, Rio Rivera NP

## 2019-03-27 NOTE — PROGRESS NOTES
[facility] Note    Georgie Gaucher is a 70 y.o. male who was seen in clinic today (3/27/2019). Subjective: Foot Pain  Patient complains of right foot pain. Onset of the symptoms was 9 days ago. Inciting event: slipped in kitchen. Current symptoms include great toe and 1st metatarsal joint. Aggravating symptoms: standing, walking. Patient's overall course: symptoms have progressed to a point and plateaued. . Patient has had no prior foot problems. Previous visits for this problem: Seen [facility] ED with negative X-ray. Evaluation to date: plain films: normal.  Treatment to date: ice, epsom salt soaks. XR Results (most recent):  EXAM: XR FOOT RT MIN 3 V     INDICATION: Right foot pain after fall.     COMPARISON: None.     FINDINGS: Three views of the right foot demonstrate no acute fracture or  dislocation. There is mild first metatarsophalangeal joint space narrowing. The  soft tissues are unremarkable.      IMPRESSION  IMPRESSION: No acute abnormality. Prior to Admission medications    Medication Sig Start Date End Date Taking? Authorizing Provider   tamsulosin (FLOMAX) 0.4 mg capsule  11/6/18  Yes Provider, Historical   levothyroxine (SYNTHROID) 75 mcg tablet TAKE 1 TABLET BY MOUTH ONCE DAILY BEFORE BREAKFAST 11/20/18  Yes Jori Singleton NP   cholecalciferol (VITAMIN D3) 1,000 unit cap Take 1,000 Units by mouth daily. Yes Provider, Historical   doxycycline (VIBRAMYCIN) 100 mg capsule Take 100 mg by mouth daily. 1/26/15  Yes Provider, Historical   DENTA 5000 PLUS 1.1 % crea two (2) times a day. 5/30/14  Yes Provider, Historical   Silodosin (RAPAFLO) 8 mg Cap Take 8 mg by mouth daily. 1/21/11   Provider, Historical          No Known Allergies        ROS  See HPI    Objective:   Physical Exam   Constitutional: He is oriented to person, place, and time. He appears well-developed and well-nourished.    Cardiovascular: Normal rate, regular rhythm and intact distal pulses. Exam reveals no gallop and no friction rub. No murmur heard. Pulmonary/Chest: Effort normal and breath sounds normal. No respiratory distress. Musculoskeletal:        Right foot: There is decreased range of motion, tenderness, bony tenderness (1st metatarsal joint) and swelling. Neurological: He is alert and oriented to person, place, and time. Psychiatric: He has a normal mood and affect. His speech is normal and behavior is normal. Thought content normal.   Nursing note and vitals reviewed. Visit Vitals  /66 (BP 1 Location: Left arm, BP Patient Position: Sitting)   Pulse 80   Temp 98.4 °F (36.9 °C) (Oral)   Resp 18   Ht 5' 9\" (1.753 m)   Wt 251 lb 9.6 oz (114.1 kg)   SpO2 99%   BMI 37.15 kg/m²       Assessment & Plan:  Diagnoses and all orders for this visit:    1. Injury of right foot, initial encounter  Ice, elevate, compression and NSAIDs for pain. Request orthopedics evaluation and treatment.   -     REFERRAL TO ORTHOPEDICS      I have discussed the diagnosis with the patient and the intended plan as seen in the above orders. The patient has received an after-visit summary along with patient information handout. I have discussed medication side effects and warnings with the patient as well. Follow-up and Dispositions    · Return if symptoms worsen or fail to improve.            Cyndi Jacobsen, JOSÉ MIGUEL

## 2019-05-31 RX ORDER — LEVOTHYROXINE SODIUM 75 UG/1
TABLET ORAL
Qty: 90 TAB | Refills: 1 | Status: SHIPPED | OUTPATIENT
Start: 2019-05-31 | End: 2019-12-05 | Stop reason: SDUPTHER

## 2019-10-25 ENCOUNTER — TELEPHONE (OUTPATIENT)
Dept: FAMILY MEDICINE CLINIC | Age: 79
End: 2019-10-25

## 2019-10-25 NOTE — TELEPHONE ENCOUNTER
Pharm on file verified. They are requesting FLUZONE  [id number] to be call in for tis patient.      LOV 03/27/2019

## 2019-12-06 RX ORDER — LEVOTHYROXINE SODIUM 75 UG/1
TABLET ORAL
Qty: 90 TAB | Refills: 1 | Status: SHIPPED | OUTPATIENT
Start: 2019-12-06 | End: 2020-05-19

## 2019-12-16 ENCOUNTER — OFFICE VISIT (OUTPATIENT)
Dept: FAMILY MEDICINE CLINIC | Age: 79
End: 2019-12-16

## 2019-12-16 ENCOUNTER — HOSPITAL ENCOUNTER (OUTPATIENT)
Dept: LAB | Age: 79
Discharge: HOME | End: 2019-12-16
Payer: MEDICARE

## 2019-12-16 VITALS
HEIGHT: 69 IN | RESPIRATION RATE: 18 BRPM | BODY MASS INDEX: 37.38 KG/M2 | WEIGHT: 252.4 LBS | HEART RATE: 72 BPM | DIASTOLIC BLOOD PRESSURE: 78 MMHG | TEMPERATURE: 97.7 F | OXYGEN SATURATION: 92 % | SYSTOLIC BLOOD PRESSURE: 112 MMHG

## 2019-12-16 DIAGNOSIS — Z12.5 SCREENING PSA (PROSTATE SPECIFIC ANTIGEN): ICD-10-CM

## 2019-12-16 DIAGNOSIS — Z00.00 ENCOUNTER FOR INITIAL PREVENTIVE PHYSICAL EXAMINATION COVERED BY MEDICARE: Primary | ICD-10-CM

## 2019-12-16 DIAGNOSIS — E03.9 ACQUIRED HYPOTHYROIDISM: ICD-10-CM

## 2019-12-16 DIAGNOSIS — J40 BRONCHITIS: ICD-10-CM

## 2019-12-16 PROCEDURE — 80061 LIPID PANEL: CPT

## 2019-12-16 PROCEDURE — 84439 ASSAY OF FREE THYROXINE: CPT

## 2019-12-16 PROCEDURE — 85027 COMPLETE CBC AUTOMATED: CPT

## 2019-12-16 PROCEDURE — 36415 COLL VENOUS BLD VENIPUNCTURE: CPT

## 2019-12-16 PROCEDURE — 84443 ASSAY THYROID STIM HORMONE: CPT

## 2019-12-16 PROCEDURE — 80053 COMPREHEN METABOLIC PANEL: CPT

## 2019-12-16 RX ORDER — FINASTERIDE 5 MG/1
TABLET, FILM COATED ORAL
Refills: 5 | COMMUNITY
Start: 2019-11-05

## 2019-12-16 RX ORDER — AZITHROMYCIN 250 MG/1
TABLET, FILM COATED ORAL
Qty: 6 TAB | Refills: 0 | Status: SHIPPED | OUTPATIENT
Start: 2019-12-16 | End: 2019-12-21

## 2019-12-16 NOTE — PROGRESS NOTES
This is the Subsequent Medicare Annual Wellness Exam, performed 12 months or more after the Initial AWV or the last Subsequent AWV    I have reviewed the patient's medical history in detail and updated the computerized patient record. History     Patient Active Problem List   Diagnosis Code    Asthma J45.909    Chronic prostatitis N41.1    History of colonoscopy Z98.890    Acquired hypothyroidism E03.9    Low vitamin D level R79.89    Obesity (BMI 30-39. 9) E66.9    ACP (advance care planning) Z71.89    Severe obesity ([address] E66.01     Past Medical History:   Diagnosis Date    Asthma 3/30/2010    Chronic prostatitis 3/30/2010    History of colonoscopy 3/30/2010    Hypothyroid 12/20/2011    Hypothyroidism     Prostate cancer ([facility] [address] 03/2013    Dr Bryon Campbell Skin cancer       Past Surgical History:   Procedure Laterality Date    COLONOSCOPY N/A 5/8/2018    COLONOSCOPY performed by Roopa Ibarra MD at [address] HX COLONOSCOPY  June 2013    polyps were found. Go back in 3 years.  HX ORTHOPAEDIC      left knee replacement    HX OTHER SURGICAL  early 2015    mohs surgery     Current Outpatient Medications   Medication Sig Dispense Refill    finasteride (PROSCAR) 5 mg tablet TAKE 1 TABLET BY MOUTH ONCE DAILY  5    levothyroxine (SYNTHROID) 75 mcg tablet TAKE 1 TABLET BY MOUTH ONCE DAILY BEFORE BREAKFAST 90 Tab 1    tamsulosin (FLOMAX) 0.4 mg capsule       cholecalciferol (VITAMIN D3) 1,000 unit cap Take 1,000 Units by mouth daily.  DENTA 5000 PLUS 1.1 % crea two (2) times a day.        No Known Allergies    Family History   Problem Relation Age of Onset    Diabetes Mother     Blindness Mother         due to diabetes    Kidney Disease Father     Cancer Father     Heart Surgery Father     Coronary Artery Disease Father     Cancer Sister     Diabetes Brother     Breast Cancer Sister     Alcohol abuse Daughter      Social History     Tobacco Use    Smoking status: Never Smoker    Smokeless tobacco: Never Used   Substance Use Topics    Alcohol use: Yes     Comment: occasional       Depression Risk Factor Screening:     3 most recent PHQ Screens 12/16/2019   Little interest or pleasure in doing things Not at all   Feeling down, depressed, irritable, or hopeless Not at all   Total Score PHQ 2 0       Alcohol Risk Factor Screening (MALE > 65): Do you average more 1 drink per night or more than 7 drinks a week: No    In the past three months have you have had more than 4 drinks containing alcohol on one occasion: Yes      Functional Ability and Level of Safety:   Hearing: Hearing is good. The patient wears hearing aids. Activities of Daily Living: The home contains: handrails, grab bars and rugs  Patient does total self care    Ambulation: with no difficulty    Fall Risk:  Fall Risk Assessment, last 12 mths 12/16/2019   Able to walk? Yes   Fall in past 12 months? No   Fall with injury? -   Number of falls in past 12 months -   Fall Risk Score -       Abuse Screen:  Patient is not abused    Cognitive Screening   Has your family/caregiver stated any concerns about your memory: no  Cognitive Screening: Normal - not done    Patient Care Team   Patient Care Team:  Fransisco Ribeiro NP as PCP - General (Family Practice)  Fransisco Ribeiro NP as PCP - [facility] Empaneled Provider  Shauna Garcia MD (Cardiology)    Assessment/Plan   Education and counseling provided:  Are appropriate based on today's review and evaluation    Diagnoses and all orders for this visit:    1. Encounter for initial preventive physical examination covered by Medicare  -     LIPID PANEL  -     METABOLIC PANEL, COMPREHENSIVE  -     CBC W/O DIFF    2. Acquired hypothyroidism  -     TSH 3RD GENERATION    3. Screening PSA (prostate specific antigen)    4.  BMI 37.0-37.9, adult        Health Maintenance Due   Topic Date Due    Shingrix Vaccine Age 49> (1 of 2) 11/18/1992    MEDICARE YEARLY EXAM  12/15/2019

## 2019-12-16 NOTE — PROGRESS NOTES
HISTORY OF PRESENT ILLNESS  Demar Jose is a 70 y.o. male. HPI: Following up for yearly physical he has histroy of chronic prostatitis, prostate cancer, hypothyroidism and obesity. He is followed by urologist every three months, who checks his PSA. His PSA ranges from [de-identified], the last one   Past Medical History:   Diagnosis Date    Asthma 3/30/2010    Chronic prostatitis 3/30/2010    History of colonoscopy 3/30/2010    Hypothyroid 12/20/2011    Hypothyroidism     Prostate cancer ([facility] [address] 03/2013    Dr Elia Izquierdo Skin cancer      Past Surgical History:   Procedure Laterality Date    COLONOSCOPY N/A 5/8/2018    COLONOSCOPY performed by Claudy Gregg MD at [address] HX COLONOSCOPY  June 2013    polyps were found. Go back in 3 years.  HX ORTHOPAEDIC      left knee replacement    HX OTHER SURGICAL  early 2015    mohs surgery     No Known Allergies    Current Outpatient Medications:     finasteride (PROSCAR) 5 mg tablet, TAKE 1 TABLET BY MOUTH ONCE DAILY, Disp: , Rfl: 5    azithromycin (ZITHROMAX) 250 mg tablet, Take 2 tablets today, then take 1 tablet daily, Disp: 6 Tab, Rfl: 0    levothyroxine (SYNTHROID) 75 mcg tablet, TAKE 1 TABLET BY MOUTH ONCE DAILY BEFORE BREAKFAST, Disp: 90 Tab, Rfl: 1    tamsulosin (FLOMAX) 0.4 mg capsule, , Disp: , Rfl:     cholecalciferol (VITAMIN D3) 1,000 unit cap, Take 1,000 Units by mouth daily. , Disp: , Rfl:     DENTA 5000 PLUS 1.1 % crea, two (2) times a day., Disp: , Rfl:   Review of Systems   Constitutional: Negative. HENT: Negative. Respiratory: Positive for sputum production. Cardiovascular: Negative. Gastrointestinal: Negative. Skin: Negative. Blood pressure 112/78, pulse 72, temperature 97.7 °F (36.5 °C), temperature source Oral, resp. rate 18, height 5' 9\" (1.753 m), weight 252 lb 6.4 oz (114.5 kg), SpO2 92 %. Body mass index is 37.27 kg/m². Physical Exam  Constitutional:       Appearance: Normal appearance. He is obese. HENT:      Mouth/Throat:      Mouth: Mucous membranes are moist.      Pharynx: Oropharynx is clear. Neck:      Musculoskeletal: Normal range of motion and neck supple. Cardiovascular:      Rate and Rhythm: Normal rate and regular rhythm. Heart sounds: No murmur. Pulmonary:      Effort: Pulmonary effort is normal.      Breath sounds: Rhonchi present. Comments: Occasional cough with coarse bronchial breathing   Abdominal:      General: Bowel sounds are normal.      Palpations: Abdomen is soft. Neurological:      Mental Status: He is alert. ASSESSMENT and PLAN  Diagnoses and all orders for this visit:    1. Encounter for initial preventive physical examination covered by Medicare  -     LIPID PANEL  -     METABOLIC PANEL, COMPREHENSIVE  -     CBC W/O DIFF    2. Acquired hypothyroidism  -     TSH 3RD GENERATION  -     T4, FREE    3. Screening PSA (prostate specific antigen)      Done bu urologist    4. BMI 37.0-37.9, adult      Diet and exericse  5. Bronchitis  -     azithromycin (ZITHROMAX) 250 mg tablet;  Take 2 tablets today, then take 1 tablet daily  Follow up in one year  Pt was given an after visit summary which includes diagnosis, current medicines and vital and voiced understanding of treatment plan

## 2019-12-16 NOTE — PROGRESS NOTES
Chief Complaint   Patient presents with    Complete Physical     Fasting     1. Have you been to the ER, urgent care clinic since your last visit? Hospitalized since your last visit? No    2. Have you seen or consulted any other health care providers outside of the [address] [place] [address] since your last visit? Include any pap smears or colon screening.  No

## 2019-12-16 NOTE — PATIENT INSTRUCTIONS
Medicare Wellness Visit, Male The best way to live healthy is to have a lifestyle where you eat a well-balanced diet, exercise regularly, limit alcohol use, and quit all forms of tobacco/nicotine, if applicable. Regular preventive services are another way to keep healthy. Preventive services (vaccines, screening tests, monitoring & exams) can help personalize your care plan, which helps you manage your own care. Screening tests can find health problems at the earliest stages, when they are easiest to treat. [facility] follows the current, evidence-based guidelines published by the [facility] ([facility]) when recommending preventive services for our patients. Because we follow these guidelines, sometimes recommendations change over time as research supports it. (For example, a prostate screening blood test is no longer routinely recommended for men with no symptoms). Of course, you and your doctor may decide to screen more often for some diseases, based on your risk and co-morbidities (chronic disease you are already diagnosed with). Preventive services for you include: - Medicare offers their members a free annual wellness visit, which is time for you and your primary care provider to discuss and plan for your preventive service needs. Take advantage of this benefit every year! 
-All adults over age 72 should receive the recommended pneumonia vaccines. Current USPSTF guidelines recommend a series of two vaccines for the best pneumonia protection.  
-All adults should have a flu vaccine yearly and tetanus vaccine every 10 years. 
-All adults age 48 and older should receive the shingles vaccines (series of two vaccines).       
-All adults age 38-68 who are overweight should have a diabetes screening test once every three years.  
-Other screening tests & preventive services for persons with diabetes include: an eye exam to screen for diabetic retinopathy, a kidney function test, a foot exam, and stricter control over your cholesterol.  
-Cardiovascular screening for adults with routine risk involves an electrocardiogram (ECG) at intervals determined by the provider.  
-Colorectal cancer screening should be done for adults age 54-65 with no increased risk factors for colorectal cancer. There are a number of acceptable methods of screening for this type of cancer. Each test has its own benefits and drawbacks. Discuss with your provider what is most appropriate for you during your annual wellness visit. The different tests include: colonoscopy (considered the best screening method), a fecal occult blood test, a fecal DNA test, and sigmoidoscopy. 
-All adults born between [facility] should be screened once for Hepatitis C. 
-An Abdominal Aortic Aneurysm (AAA) Screening is recommended for men age 73-68 who has ever smoked in their lifetime. Here is a list of your current Health Maintenance items (your personalized list of preventive services) with a due date: 
Health Maintenance Due Topic Date Due  Shingles Vaccine (1 of 2) 11/18/1992 Mine Hogan Annual Well Visit  12/15/2019

## 2019-12-17 LAB
ALBUMIN SERPL-MCNC: 4.5 G/DL (ref 3.5–4.8)
ALBUMIN/GLOB SERPL: 2.1 {RATIO} (ref 1.2–2.2)
ALP SERPL-CCNC: 65 IU/L (ref 39–117)
ALT SERPL-CCNC: 15 IU/L (ref 0–44)
AST SERPL-CCNC: 24 IU/L (ref 0–40)
BILIRUB SERPL-MCNC: 0.6 MG/DL (ref 0–1.2)
BUN SERPL-MCNC: 19 MG/DL (ref 8–27)
BUN/CREAT SERPL: 18 (ref 10–24)
CALCIUM SERPL-MCNC: 9.4 MG/DL (ref 8.6–10.2)
CHLORIDE SERPL-SCNC: 105 MMOL/L (ref 96–106)
CHOLEST SERPL-MCNC: 191 MG/DL (ref 100–199)
CO2 SERPL-SCNC: 22 MMOL/L (ref 20–29)
CREAT SERPL-MCNC: 1.07 MG/DL (ref 0.76–1.27)
ERYTHROCYTE [DISTWIDTH] IN BLOOD BY AUTOMATED COUNT: 12.7 % (ref 12.3–15.4)
GLOBULIN SER CALC-MCNC: 2.1 G/DL (ref 1.5–4.5)
GLUCOSE SERPL-MCNC: 96 MG/DL (ref 65–99)
HCT VFR BLD AUTO: 42.3 % (ref 37.5–51)
HDLC SERPL-MCNC: 59 MG/DL
HGB BLD-MCNC: 15 G/DL (ref 13–17.7)
INTERPRETATION, 910389: NORMAL
LDLC SERPL CALC-MCNC: 106 MG/DL (ref 0–99)
MCH RBC QN AUTO: 32.9 PG (ref 26.6–33)
MCHC RBC AUTO-ENTMCNC: 35.5 G/DL (ref 31.5–35.7)
MCV RBC AUTO: 93 FL (ref 79–97)
PLATELET # BLD AUTO: 183 X10E3/UL (ref 150–450)
POTASSIUM SERPL-SCNC: 4.4 MMOL/L (ref 3.5–5.2)
PROT SERPL-MCNC: 6.6 G/DL (ref 6–8.5)
RBC # BLD AUTO: 4.56 X10E6/UL (ref 4.14–5.8)
SODIUM SERPL-SCNC: 141 MMOL/L (ref 134–144)
T4 FREE SERPL-MCNC: 1.12 NG/DL (ref 0.82–1.77)
TRIGL SERPL-MCNC: 129 MG/DL (ref 0–149)
TSH SERPL DL<=0.005 MIU/L-ACNC: 2.09 UIU/ML (ref 0.45–4.5)
VLDLC SERPL CALC-MCNC: 26 MG/DL (ref 5–40)
WBC # BLD AUTO: 7.6 X10E3/UL (ref 3.4–10.8)

## 2020-05-19 RX ORDER — LEVOTHYROXINE SODIUM 75 UG/1
TABLET ORAL
Qty: 90 TAB | Refills: 0 | Status: SHIPPED | OUTPATIENT
Start: 2020-05-19 | End: 2020-09-09

## 2020-09-09 RX ORDER — LEVOTHYROXINE SODIUM 75 UG/1
TABLET ORAL
Qty: 90 TAB | Refills: 0 | Status: SHIPPED | OUTPATIENT
Start: 2020-09-09 | End: 2020-12-01

## 2020-12-01 RX ORDER — LEVOTHYROXINE SODIUM 75 UG/1
TABLET ORAL
Qty: 90 TAB | Refills: 0 | Status: SHIPPED | OUTPATIENT
Start: 2020-12-01 | End: 2021-02-24

## 2020-12-23 ENCOUNTER — OFFICE VISIT (OUTPATIENT)
Dept: FAMILY MEDICINE CLINIC | Age: 80
End: 2020-12-23
Payer: MEDICARE

## 2020-12-23 VITALS
DIASTOLIC BLOOD PRESSURE: 79 MMHG | SYSTOLIC BLOOD PRESSURE: 126 MMHG | RESPIRATION RATE: 16 BRPM | HEART RATE: 63 BPM | BODY MASS INDEX: 37.89 KG/M2 | TEMPERATURE: 97.7 F | OXYGEN SATURATION: 96 % | WEIGHT: 255.8 LBS | HEIGHT: 69 IN

## 2020-12-23 DIAGNOSIS — E03.9 ACQUIRED HYPOTHYROIDISM: ICD-10-CM

## 2020-12-23 DIAGNOSIS — E66.01 SEVERE OBESITY (HCC): ICD-10-CM

## 2020-12-23 DIAGNOSIS — E55.9 VITAMIN D DEFICIENCY: ICD-10-CM

## 2020-12-23 DIAGNOSIS — Z00.00 ENCOUNTER FOR INITIAL PREVENTIVE PHYSICAL EXAMINATION COVERED BY MEDICARE: Primary | ICD-10-CM

## 2020-12-23 DIAGNOSIS — Z12.5 SCREENING PSA (PROSTATE SPECIFIC ANTIGEN): ICD-10-CM

## 2020-12-23 PROCEDURE — G8419 CALC BMI OUT NRM PARAM NOF/U: HCPCS | Performed by: NURSE PRACTITIONER

## 2020-12-23 PROCEDURE — 1101F PT FALLS ASSESS-DOCD LE1/YR: CPT | Performed by: NURSE PRACTITIONER

## 2020-12-23 PROCEDURE — G8427 DOCREV CUR MEDS BY ELIG CLIN: HCPCS | Performed by: NURSE PRACTITIONER

## 2020-12-23 PROCEDURE — G8432 DEP SCR NOT DOC, RNG: HCPCS | Performed by: NURSE PRACTITIONER

## 2020-12-23 PROCEDURE — G0439 PPPS, SUBSEQ VISIT: HCPCS | Performed by: NURSE PRACTITIONER

## 2020-12-23 PROCEDURE — G8536 NO DOC ELDER MAL SCRN: HCPCS | Performed by: NURSE PRACTITIONER

## 2020-12-23 NOTE — PROGRESS NOTES
Chief Complaint   Patient presents with   [facility] Annual Wellness Visit       1. Have you been to the ER, urgent care clinic since your last visit? Hospitalized since your last visit? No    2. Have you seen or consulted any other health care providers outside of the [address] [place] [address] since your last visit? Include any pap smears or colon screening. No    3 most recent PHQ Screens 12/23/2020   Little interest or pleasure in doing things Not at all   Feeling down, depressed, irritable, or hopeless Not at all   Total Score PHQ 2 0       Abuse Screening Questionnaire 12/23/2020   Do you ever feel afraid of your partner? N   Are you in a relationship with someone who physically or mentally threatens you? N   Is it safe for you to go home? Y       ADL Assessment 12/23/2020   Feeding yourself No Help Needed   Getting from bed to chair No Help Needed   Getting dressed No Help Needed   Bathing or showering No Help Needed   Walk across the room (includes cane/walker) No Help Needed   Using the telphone No Help Needed   Taking your medications No Help Needed   Preparing meals No Help Needed   Managing money (expenses/bills) No Help Needed   Moderately strenuous housework (laundry) No Help Needed   Shopping for personal items (toiletries/medicines) No Help Needed   Shopping for groceries No Help Needed   Driving No Help Needed   Climbing a flight of stairs No Help Needed   Getting to places beyond walking distances No Help Needed       Fall Risk Assessment, last 12 mths 12/23/2020   Able to walk? Yes   Fall in past 12 months? -   Number of falls in past 12 months -   Fall with injury?  -

## 2020-12-23 NOTE — PROGRESS NOTES
[facility] Note  Subjective:      Rafi Prado is a 68 y.o. male who presents for medicare physical. She has history of prostate cancer, hypothyroidism skin cancer . He is followed by dermatologist every six months and urologist every six months. His last PAS in June was 5.0    Past Medical History:   Diagnosis Date    Asthma 3/30/2010    Chronic prostatitis 3/30/2010    History of colonoscopy 3/30/2010    Hypothyroid 12/20/2011    Hypothyroidism     Prostate cancer ([address] 03/2013    Dr Jaqueline Cordova Skin cancer      Past Surgical History:   Procedure Laterality Date    COLONOSCOPY N/A 5/8/2018    COLONOSCOPY performed by Balbina Chambers MD at [address] HX COLONOSCOPY  June 2013    polyps were found. Go back in 3 years.  HX ORTHOPAEDIC      left knee replacement    HX OTHER SURGICAL  early 2015    mohs surgery       Current Outpatient Medications   Medication Sig Dispense Refill    Euthyrox 75 mcg tablet TAKE 1 TABLET BY MOUTH ONCE DAILY BEFORE BREAKFAST 90 Tab 0    finasteride (PROSCAR) 5 mg tablet TAKE 1 TABLET BY MOUTH ONCE DAILY  5    tamsulosin (FLOMAX) 0.4 mg capsule       cholecalciferol (VITAMIN D3) 1,000 unit cap Take 1,000 Units by mouth daily.  DENTA 5000 PLUS 1.1 % crea two (2) times a day. No Known Allergies    ROS:   Complete review of systems was reviewed with pertinent information listed in HPI. Review of Systems   Constitutional: Negative. HENT: Positive for hearing loss. Respiratory: Negative. Cardiovascular: Negative. Gastrointestinal: Negative. Genitourinary: Negative. Skin: Negative. Psychiatric/Behavioral: Negative. Objective:     Visit Vitals  /79 (BP 1 Location: Left arm, BP Patient Position: Sitting)   Pulse 63   Temp 97.7 °F (36.5 °C) (Temporal)   Resp 16   Ht 5' 9\" (1.753 m)   Wt 255 lb 12.8 oz (116 kg)   SpO2 96%   BMI 37.78 kg/m²       Vitals and Nurse Documentation reviewed. Physical Exam  Constitutional:       Appearance: Normal appearance. He is obese. HENT:      Right Ear: Tympanic membrane normal.      Left Ear: Tympanic membrane normal.      Mouth/Throat:      Mouth: Mucous membranes are moist.   Eyes:      Extraocular Movements: Extraocular movements intact. Pupils: Pupils are equal, round, and reactive to light. Neck:      Musculoskeletal: Normal range of motion and neck supple. Cardiovascular:      Rate and Rhythm: Normal rate and regular rhythm. Pulses: Normal pulses. Heart sounds: Normal heart sounds. No murmur. Pulmonary:      Effort: Pulmonary effort is normal.      Breath sounds: Normal breath sounds. Abdominal:      General: Bowel sounds are normal.      Palpations: Abdomen is soft. Skin:     General: Skin is dry. Neurological:      Mental Status: He is alert. Psychiatric:         Mood and Affect: Mood normal.         Thought Content: Thought content normal.         Assessment/Plan:     Diagnoses and all orders for this visit:    1. Encounter for initial preventive physical examination covered by Medicare  -     CBC W/O DIFF; Future  -     LIPID PANEL; Future  -     METABOLIC PANEL, COMPREHENSIVE; Future    2. Acquired hypothyroidism  -     TSH 3RD GENERATION; Future  -     T4, FREE; Future    3. Screening PSA (prostate specific antigen)  -     PSA W/ REFLX FREE PSA; Future    4. BMI 37.0-37.9, adult    5. Vitamin D deficiency  -     PSA W/ REFLX FREE PSA; Future  -     VITAMIN D, 25 HYDROXY; Future    6.  Severe obesity ([address]          Pt expressed understanding with the diagnosis and plan        Discussed expected course/resolution/complications of diagnosis in detail with patient.    Medication risks/benefits/costs/interactions/alternatives discussed with patient.    Pt was given an after visit summary which includes diagnoses, current medications & vitals.  Pt expressed understanding with the diagnosis and plan  This is the Subsequent Medicare Annual Wellness Exam, performed 12 months or more after the Initial AWV or the last Subsequent AWV    I have reviewed the patient's medical history in detail and updated the computerized patient record. Depression Risk Factor Screening:     3 most recent PHQ Screens 12/23/2020   Little interest or pleasure in doing things Not at all   Feeling down, depressed, irritable, or hopeless Not at all   Total Score PHQ 2 0       Alcohol Risk Screen    Do you average more than 1 drink per night or more than 7 drinks a week: No    In the past three months have you have had more than 4 drinks containing alcohol on one occasion: Yes        Functional Ability and Level of Safety:    Hearing: The patient wears hearing aids. Activities of Daily Living: The home contains: handrails, grab bars and rugs  Patient does total self care      Ambulation: with no difficulty     Fall Risk:  Fall Risk Assessment, last 12 mths 12/23/2020   Able to walk? Yes   Fall in past 12 months? -   Number of falls in past 12 months -   Fall with injury? -      Abuse Screen:  Patient is not abused       Cognitive Screening    Has your family/caregiver stated any concerns about your memory: no     Cognitive Screening: Normal - Verbal Fluency Test    Assessment/Plan   Education and counseling provided:  Are appropriate based on today's review and evaluation    Diagnoses and all orders for this visit:    1. Encounter for initial preventive physical examination covered by Medicare  -     CBC W/O DIFF; Future  -     LIPID PANEL; Future  -     METABOLIC PANEL, COMPREHENSIVE; Future    2. Acquired hypothyroidism  -     TSH 3RD GENERATION; Future  -     T4, FREE; Future    3. Screening PSA (prostate specific antigen)  -     PSA W/ REFLX FREE PSA; Future    4. BMI 37.0-37.9, adult    5. Vitamin D deficiency  -     PSA W/ REFLX FREE PSA; Future  -     VITAMIN D, 25 HYDROXY; Future    6.  Severe obesity ([address]        Health Maintenance Due     Health Maintenance Due   Topic Date Due    Shingrix Vaccine Age 49> (1 of 2) 11/18/1992    GLAUCOMA SCREENING Q2Y  04/11/2020    Medicare Yearly Exam  12/16/2020       Patient Care Team   Patient Care Team:  Adán Moss NP as PCP - General (Family Medicine)  Ad[facility] Moss [facility]  Meaghan Carl MD (Cardiology)    History     Patient Active Problem List   Diagnosis Code    Asthma J45.909    Chronic prostatitis N41.1    History of colonoscopy Z98.890    Acquired hypothyroidism E03.9    Low vitamin D level R79.89    Obesity (BMI 30-39. 9) E66.9    ACP (advance care planning) Z71.89    Severe obesity ([address] E66.01     Past Medical History:   Diagnosis Date    Asthma 3/30/2010    Chronic prostatitis 3/30/2010    History of colonoscopy 3/30/2010    Hypothyroid 12/20/2011    Hypothyroidism     Pro[address] [address] 03/2013    Dr Jeremy Kothari Skin cancer       Past Surgical History:   Procedure Laterality Date    COLONOSCOPY N/A 5/8/2018    COLONOSCOPY performed by Shivani Juan MD at [address] HX COLONOSCOPY  June 2013    polyps were found. Go back in 3 years.  HX ORTHOPAEDIC      left knee replacement    HX OTHER SURGICAL  early 2015    mohs surgery     Current Outpatient Medications   Medication Sig Dispense Refill    Euthyrox 75 mcg tablet TAKE 1 TABLET BY MOUTH ONCE DAILY BEFORE BREAKFAST 90 Tab 0    finasteride (PROSCAR) 5 mg tablet TAKE 1 TABLET BY MOUTH ONCE DAILY  5    tamsulosin (FLOMAX) 0.4 mg capsule       cholecalciferol (VITAMIN D3) 1,000 unit cap Take 1,000 Units by mouth daily.  DENTA 5000 PLUS 1.1 % crea two (2) times a day.        No Known Allergies    Family History   Problem Relation Age of Onset    Diabetes Mother     Blindness Mother         due to diabetes    Kidney Disease Father     Cancer Father     Heart Surgery Father     Coronary Artery Disease Father     Cancer Sister     Diabetes Brother     Breast Cancer Sister     Alcohol abuse Daughter      Social History     Tobacco Use    Smoking status: Never Smoker    Smokeless tobacco: Never Used   Substance Use Topics    Alcohol use: Yes     Comment: occasional

## 2020-12-24 LAB
25(OH)D3 SERPL-MCNC: 40.2 NG/ML (ref 30–100)
ALBUMIN SERPL-MCNC: 4 G/DL (ref 3.5–5)
ALBUMIN/GLOB SERPL: 1.4 {RATIO} (ref 1.1–2.2)
ALP SERPL-CCNC: 72 U/L (ref 45–117)
ALT SERPL-CCNC: 20 U/L (ref 12–78)
ANION GAP SERPL CALC-SCNC: 4 MMOL/L (ref 5–15)
AST SERPL-CCNC: 26 U/L (ref 15–37)
BILIRUB SERPL-MCNC: 0.7 MG/DL (ref 0.2–1)
BUN SERPL-MCNC: 16 MG/DL (ref 6–20)
BUN/CREAT SERPL: 15 (ref 12–20)
CALCIUM SERPL-MCNC: 9.1 MG/DL (ref 8.5–10.1)
CHLORIDE SERPL-SCNC: 106 MMOL/L (ref 97–108)
CHOLEST SERPL-MCNC: 207 MG/DL
CO2 SERPL-SCNC: 30 MMOL/L (ref 21–32)
CREAT SERPL-MCNC: 1.1 MG/DL (ref 0.7–1.3)
ERYTHROCYTE [DISTWIDTH] IN BLOOD BY AUTOMATED COUNT: 12.4 % (ref 11.5–14.5)
GLOBULIN SER CALC-MCNC: 2.9 G/DL (ref 2–4)
GLUCOSE SERPL-MCNC: 98 MG/DL (ref 65–100)
HCT VFR BLD AUTO: 42.9 % (ref 36.6–50.3)
HDLC SERPL-MCNC: 61 MG/DL
HDLC SERPL: 3.4 {RATIO} (ref 0–5)
HGB BLD-MCNC: 14.4 G/DL (ref 12.1–17)
LDLC SERPL CALC-MCNC: 119.8 MG/DL (ref 0–100)
LIPID PROFILE,FLP: ABNORMAL
MCH RBC QN AUTO: 32.1 PG (ref 26–34)
MCHC RBC AUTO-ENTMCNC: 33.6 G/DL (ref 30–36.5)
MCV RBC AUTO: 95.8 FL (ref 80–99)
NRBC # BLD: 0 K/UL (ref 0–0.01)
NRBC BLD-RTO: 0 PER 100 WBC
PLATELET # BLD AUTO: 178 K/UL (ref 150–400)
PMV BLD AUTO: 9.6 FL (ref 8.9–12.9)
POTASSIUM SERPL-SCNC: 4.2 MMOL/L (ref 3.5–5.1)
PROT SERPL-MCNC: 6.9 G/DL (ref 6.4–8.2)
PSA SERPL-MCNC: 3.8 NG/ML (ref 0–4)
RBC # BLD AUTO: 4.48 M/UL (ref 4.1–5.7)
REFLEX CRITERIA: NORMAL
SODIUM SERPL-SCNC: 140 MMOL/L (ref 136–145)
T4 FREE SERPL-MCNC: 1 NG/DL (ref 0.8–1.5)
TRIGL SERPL-MCNC: 131 MG/DL (ref ?–150)
TSH SERPL DL<=0.05 MIU/L-ACNC: 2.78 UIU/ML (ref 0.36–3.74)
VLDLC SERPL CALC-MCNC: 26.2 MG/DL
WBC # BLD AUTO: 6.9 K/UL (ref 4.1–11.1)

## 2021-02-24 RX ORDER — LEVOTHYROXINE SODIUM 75 UG/1
TABLET ORAL
Qty: 90 TAB | Refills: 0 | Status: SHIPPED | OUTPATIENT
Start: 2021-02-24 | End: 2021-05-25

## 2021-05-25 RX ORDER — LEVOTHYROXINE SODIUM 75 UG/1
TABLET ORAL
Qty: 90 TABLET | Refills: 0 | Status: SHIPPED | OUTPATIENT
Start: 2021-05-25 | End: 2021-08-20 | Stop reason: SDUPTHER

## 2021-06-01 ENCOUNTER — OFFICE VISIT (OUTPATIENT)
Dept: FAMILY MEDICINE CLINIC | Age: 81
End: 2021-06-01
Payer: MEDICARE

## 2021-06-01 VITALS
WEIGHT: 247.4 LBS | HEART RATE: 58 BPM | BODY MASS INDEX: 36.64 KG/M2 | OXYGEN SATURATION: 98 % | TEMPERATURE: 97.6 F | DIASTOLIC BLOOD PRESSURE: 61 MMHG | HEIGHT: 69 IN | SYSTOLIC BLOOD PRESSURE: 100 MMHG | RESPIRATION RATE: 16 BRPM

## 2021-06-01 DIAGNOSIS — E03.9 ACQUIRED HYPOTHYROIDISM: Primary | ICD-10-CM

## 2021-06-01 DIAGNOSIS — E66.01 SEVERE OBESITY (HCC): ICD-10-CM

## 2021-06-01 LAB
T4 FREE SERPL-MCNC: 1 NG/DL (ref 0.8–1.5)
TSH SERPL DL<=0.05 MIU/L-ACNC: 2.07 UIU/ML (ref 0.36–3.74)

## 2021-06-01 PROCEDURE — G8427 DOCREV CUR MEDS BY ELIG CLIN: HCPCS | Performed by: NURSE PRACTITIONER

## 2021-06-01 PROCEDURE — G8417 CALC BMI ABV UP PARAM F/U: HCPCS | Performed by: NURSE PRACTITIONER

## 2021-06-01 PROCEDURE — G8536 NO DOC ELDER MAL SCRN: HCPCS | Performed by: NURSE PRACTITIONER

## 2021-06-01 PROCEDURE — 99213 OFFICE O/P EST LOW 20 MIN: CPT | Performed by: NURSE PRACTITIONER

## 2021-06-01 PROCEDURE — 1101F PT FALLS ASSESS-DOCD LE1/YR: CPT | Performed by: NURSE PRACTITIONER

## 2021-06-01 PROCEDURE — G0463 HOSPITAL OUTPT CLINIC VISIT: HCPCS | Performed by: NURSE PRACTITIONER

## 2021-06-01 PROCEDURE — G8432 DEP SCR NOT DOC, RNG: HCPCS | Performed by: NURSE PRACTITIONER

## 2021-06-01 NOTE — PROGRESS NOTES
Chief Complaint   Patient presents with    Thyroid Problem     follow up       1. Have you been to the ER, urgent care clinic since your last visit? Hospitalized since your last visit? No    2. Have you seen or consulted any other health care providers outside of the [address] [place] [address] since your last visit? Include any pap smears or colon screening.  No    3 most recent PHQ Screens 6/1/2021   Little interest or pleasure in doing things Not at all   Feeling down, depressed, irritable, or hopeless Not at all   Total Score PHQ 2 0

## 2021-06-01 NOTE — PROGRESS NOTES
[facility] Note  Subjective:      Alvaro Barron is a 68 y.o. male who presents to check TSH and free T4. He has history of hypothyroidism, obesity and thyroid cancer, he is followed by urologist .  He will bring in his ACP    Past Medical History:   Diagnosis Date    Asthma 3/30/2010    Chronic prostatitis 3/30/2010    History of colonoscopy 3/30/2010    Hypothyroid 12/20/2011    Hypothyroidism     Prostate cancer ([facility] [address] 03/2013    Dr Vinod Gómez Skin cancer      Past Surgical History:   Procedure Laterality Date    COLONOSCOPY N/A 5/8/2018    COLONOSCOPY performed by Tatyana Jones MD at [address] HX COLONOSCOPY  June 2013    polyps were found. Go back in 3 years.  HX ORTHOPAEDIC      left knee replacement    [facility] OTHER [facility]GICAL  [facility]       Current Outpatient Medications   Medication Sig Dispense Refill    Euthyrox 75 mcg tablet TAKE 1 TABLET BY MOUTH ONCE DAILY BEFORE BREAKFAST 90 Tablet 0    finasteride (PROSCAR) 5 mg tablet TAKE 1 TABLET BY MOUTH ONCE DAILY  5    tamsulosin (FLOMAX) 0.4 mg capsule       cholecalciferol (VITAMIN D3) 1,000 unit cap Take 1,000 Units by mouth daily.  DENTA 5000 PLUS 1.1 % crea two (2) times a day. No Known Allergies    ROS:   Complete review of systems was reviewed with pertinent information listed in HPI. Review of Systems   Constitutional: Negative. HENT: Negative. Respiratory: Negative. Cardiovascular: Negative. Gastrointestinal: Negative. Objective:     Visit Vitals  /61 (BP 1 Location: Right arm, BP Patient Position: Sitting, BP Cuff Size: Large adult)   Pulse (!) 58   Temp 97.6 °F (36.4 °C) (Temporal)   Resp 16   Ht 5' 9\" (1.753 m)   Wt 247 lb 6.4 oz (112.2 kg)   SpO2 98%   BMI 36.53 kg/m²       Vitals and Nurse Documentation reviewed. Physical Exam  Constitutional:       Appearance: Normal appearance. He is obese.    HENT:      Mouth/Throat:      Mouth: Mucous membranes are moist.   Cardiovascular:      Rate and Rhythm: Normal rate and regular rhythm. Pulses: Normal pulses. Heart sounds: Normal heart sounds. No murmur heard. Pulmonary:      Effort: Pulmonary effort is normal.      Breath sounds: Normal breath sounds. Abdominal:      General: Bowel sounds are normal.      Palpations: Abdomen is soft. Musculoskeletal:      Cervical back: Normal range of motion and neck supple. Neurological:      Mental Status: He is alert. Psychiatric:         Mood and Affect: Mood normal.         Thought Content: Thought content normal.         Assessment/Plan:     Diagnoses and all orders for this visit:    1. Acquired hypothyroidism  -     TSH 3RD GENERATION; Future  -     T4, FREE; Future    2.  Severe obesity ([facility] [address]    follow up in six months      Pt expressed understanding with the diagnosis and plan        Discussed expected course/resolution/complications of diagnosis in detail with patient.    Medication risks/benefits/costs/interactions/alternatives discussed with patient.    Pt was given an after visit summary which includes diagnoses, current medications & vitals.  Pt expressed understanding with the diagnosis and plan

## 2021-08-20 RX ORDER — LEVOTHYROXINE SODIUM 75 UG/1
TABLET ORAL
Qty: 90 TABLET | Refills: 0 | Status: SHIPPED | OUTPATIENT
Start: 2021-08-20 | End: 2021-08-25

## 2021-08-25 RX ORDER — LEVOTHYROXINE SODIUM 75 UG/1
TABLET ORAL
Qty: 90 TABLET | Refills: 0 | Status: SHIPPED | OUTPATIENT
Start: 2021-08-25 | End: 2022-02-15

## 2021-12-28 ENCOUNTER — OFFICE VISIT (OUTPATIENT)
Dept: FAMILY MEDICINE CLINIC | Age: 81
End: 2021-12-28
Payer: MEDICARE

## 2021-12-28 VITALS
OXYGEN SATURATION: 95 % | HEIGHT: 69 IN | WEIGHT: 240 LBS | HEART RATE: 61 BPM | TEMPERATURE: 97.4 F | BODY MASS INDEX: 35.55 KG/M2 | RESPIRATION RATE: 16 BRPM | SYSTOLIC BLOOD PRESSURE: 113 MMHG | DIASTOLIC BLOOD PRESSURE: 66 MMHG

## 2021-12-28 DIAGNOSIS — Z00.00 ENCOUNTER FOR INITIAL PREVENTIVE PHYSICAL EXAMINATION COVERED BY MEDICARE: Primary | ICD-10-CM

## 2021-12-28 DIAGNOSIS — E03.9 ACQUIRED HYPOTHYROIDISM: ICD-10-CM

## 2021-12-28 LAB
ALBUMIN SERPL-MCNC: 4 G/DL (ref 3.5–5)
ALBUMIN/GLOB SERPL: 1.4 {RATIO} (ref 1.1–2.2)
ALP SERPL-CCNC: 80 U/L (ref 45–117)
ALT SERPL-CCNC: 27 U/L (ref 12–78)
ANION GAP SERPL CALC-SCNC: 5 MMOL/L (ref 5–15)
AST SERPL-CCNC: 21 U/L (ref 15–37)
BILIRUB SERPL-MCNC: 0.6 MG/DL (ref 0.2–1)
BUN SERPL-MCNC: 14 MG/DL (ref 6–20)
BUN/CREAT SERPL: 13 (ref 12–20)
CALCIUM SERPL-MCNC: 9.4 MG/DL (ref 8.5–10.1)
CHLORIDE SERPL-SCNC: 106 MMOL/L (ref 97–108)
CO2 SERPL-SCNC: 28 MMOL/L (ref 21–32)
CREAT SERPL-MCNC: 1.05 MG/DL (ref 0.7–1.3)
ERYTHROCYTE [DISTWIDTH] IN BLOOD BY AUTOMATED COUNT: 12.1 % (ref 11.5–14.5)
GLOBULIN SER CALC-MCNC: 2.9 G/DL (ref 2–4)
GLUCOSE SERPL-MCNC: 100 MG/DL (ref 65–100)
HCT VFR BLD AUTO: 42.5 % (ref 36.6–50.3)
HGB BLD-MCNC: 14.1 G/DL (ref 12.1–17)
MCH RBC QN AUTO: 32.6 PG (ref 26–34)
MCHC RBC AUTO-ENTMCNC: 33.2 G/DL (ref 30–36.5)
MCV RBC AUTO: 98.2 FL (ref 80–99)
NRBC # BLD: 0 K/UL (ref 0–0.01)
NRBC BLD-RTO: 0 PER 100 WBC
PLATELET # BLD AUTO: 199 K/UL (ref 150–400)
PMV BLD AUTO: 9.5 FL (ref 8.9–12.9)
POTASSIUM SERPL-SCNC: 4.6 MMOL/L (ref 3.5–5.1)
PROT SERPL-MCNC: 6.9 G/DL (ref 6.4–8.2)
RBC # BLD AUTO: 4.33 M/UL (ref 4.1–5.7)
SODIUM SERPL-SCNC: 139 MMOL/L (ref 136–145)
T4 FREE SERPL-MCNC: 1 NG/DL (ref 0.8–1.5)
TSH SERPL DL<=0.05 MIU/L-ACNC: 2.31 UIU/ML (ref 0.36–3.74)
WBC # BLD AUTO: 9.4 K/UL (ref 4.1–11.1)

## 2021-12-28 PROCEDURE — G8536 NO DOC ELDER MAL SCRN: HCPCS | Performed by: NURSE PRACTITIONER

## 2021-12-28 PROCEDURE — G8417 CALC BMI ABV UP PARAM F/U: HCPCS | Performed by: NURSE PRACTITIONER

## 2021-12-28 PROCEDURE — G8427 DOCREV CUR MEDS BY ELIG CLIN: HCPCS | Performed by: NURSE PRACTITIONER

## 2021-12-28 PROCEDURE — G0439 PPPS, SUBSEQ VISIT: HCPCS | Performed by: NURSE PRACTITIONER

## 2021-12-28 PROCEDURE — 1101F PT FALLS ASSESS-DOCD LE1/YR: CPT | Performed by: NURSE PRACTITIONER

## 2021-12-28 PROCEDURE — G8432 DEP SCR NOT DOC, RNG: HCPCS | Performed by: NURSE PRACTITIONER

## 2021-12-28 NOTE — PROGRESS NOTES
[facility] [facility] Note  Subjective:      Chun Cristina is a 80 y.o. male who presents for medicare physical and chronic problems management. 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 Taking medications as prescribed  No medication side effects reported. Past Medical History:   Diagnosis Date    Asthma 3/30/2010    Chronic prostatitis 3/30/2010    History of colonoscopy 3/30/2010    Hypothyroid 12/20/2011    Hypothyroidism     Prostate cancer ([facility] [address] 03/2013    Dr Butler Sensor Skin cancer      Past Surgical History:   Procedure Laterality Date    COLONOSCOPY N/A 5/8/2018    COLONOSCOPY performed by Leon Nguyen MD at [address] HX COLONOSCOPY  June 2013    polyps were found. Go back in 3 years.  HX ORTHOPAEDIC      left knee replacement    HX OTHER SURGICAL  early 2015    mohs surgery       Current Outpatient Medications   Medication Sig Dispense Refill    Euthyrox 75 mcg tablet TAKE 1 TABLET BY MOUTH ONCE DAILY BEFORE BREAKFAST 90 Tablet 0    finasteride (PROSCAR) 5 mg tablet TAKE 1 TABLET BY MOUTH ONCE DAILY  5    tamsulosin (FLOMAX) 0.4 mg capsule       cholecalciferol (VITAMIN D3) 1,000 unit cap 5,000 Units.  DENTA 5000 PLUS 1.1 % crea two (2) times a day. No Known Allergies    ROS:   Complete review of systems was reviewed with pertinent information listed in HPI. Review of Systems   Constitutional: Negative. HENT: Positive for hearing loss. Respiratory: Negative. Cardiovascular: Negative. Gastrointestinal: Negative. Genitourinary: Negative. Musculoskeletal: Negative. Neurological: Negative. Psychiatric/Behavioral: Negative.         Objective:     Visit Vitals  /66 (BP 1 Location: Left upper arm, BP Patient Position: Sitting, BP Cuff Size: Adult) Pulse 61   Temp 97.4 °F (36.3 °C) (Temporal)   Resp 16   Ht 5' 9\" (1.753 m)   Wt 240 lb (108.9 kg)   SpO2 95%   BMI 35.44 kg/m²       Vitals and Nurse Documentation reviewed. Physical Exam  Constitutional:       Appearance: Normal appearance. He is obese. HENT:      Right Ear: Tympanic membrane normal.      Left Ear: Tympanic membrane normal.      Ears:      Comments: Wears hearing aid     Mouth/Throat:      Mouth: Mucous membranes are moist.   Eyes:      Extraocular Movements: Extraocular movements intact. Pupils: Pupils are equal, round, and reactive to light. Cardiovascular:      Rate and Rhythm: Normal rate and regular rhythm. Pulses: Normal pulses. Heart sounds: Normal heart sounds. No murmur heard. Pulmonary:      Effort: Pulmonary effort is normal.      Breath sounds: Normal breath sounds. Abdominal:      General: Bowel sounds are normal.      Palpations: Abdomen is soft. Musculoskeletal:         General: Normal range of motion. Cervical back: Normal range of motion and neck supple. Skin:     General: Skin is warm and dry. Comments: Basal cell in scalp   Neurological:      Mental Status: He is alert and oriented to person, place, and time. Psychiatric:         Mood and Affect: Mood normal.         Thought Content: Thought content normal.         Assessment/Plan:     Diagnoses and all orders for this visit:    1. Encounter for initial preventive physical examination covered by Medicare  -     [facility] W/O DIFF; Future  -     METABOLIC PANEL, COMPREHENSIVE; Future    2. Acquired hypothyroidism  -     TSH 3RD GENERATION; Future  -     T4, FREE; Future    3.  BMI 35.0-35.9,adult  Diet and exercise           Pt expressed understanding with the diagnosis and plan        Discussed expected course/resolution/complications of diagnosis in detail with patient.    Medication risks/benefits/costs/interactions/alternatives discussed with patient.    Pt was given an after visit summary which includes diagnoses, current medications & vitals.  Pt expressed understanding with the diagnosis and plan  This is the Subsequent Medicare Annual Wellness Exam, performed 12 months or more after the Initial AWV or the last Subsequent AWV    I have reviewed the patient's medical history in detail and updated the computerized patient record. Assessment/Plan   Education and counseling provided:  Are appropriate based on today's review and evaluation    1. Encounter for initial preventive physical examination covered by Medicare  -     CBC W/O DIFF; Future  -     METABOLIC PANEL, COMPREHENSIVE; Future  2. Acquired hypothyroidism  -     TSH 3RD GENERATION; Future  -     T4, FREE; Future         Depression Risk Factor Screening     3 most recent PHQ Screens 12/28/2021   Little interest or pleasure in doing things Not at all   Feeling down, depressed, irritable, or hopeless Not at all   Total Score PHQ 2 0       Alcohol & Drug Abuse Risk Screen    Do you average more than 1 drink per night or more than 7 drinks a week: No    In the past three months have you have had more than 4 drinks containing alcohol on one occasion: Yes          Functional Ability and Level of Safety    Hearing: The patient wears hearing aids. Activities of Daily Living: The home contains: handrails, grab bars and rugs  Patient does total self care      Ambulation: with no difficulty     Fall Risk:  Fall Risk Assessment, last 12 mths 12/28/2021   Able to walk? Yes   Fall in past 12 months? 0   Do you feel unsteady? 0   Are you worried about falling 0   Number of falls in past 12 months -   Fall with injury?  -      Abuse Screen:  Patient is not abused       Cognitive Screening    Has your family/caregiver stated any concerns about your memory: no     Cognitive Screening: Normal - Verbal Fluency Test    Health Maintenance Due     Health Maintenance Due   Topic Date Due    COVID-19 Vaccine (1) Never done    Shingrix Vaccine Age 50> (1 of 2) Never done    Medicare Yearly Exam  12/24/2021       Patient Care Team   Patient Care Team:  Ra Hardin NP as PCP - General (Family Medicine)  Ra Hardin [facility]  Klaudia Vega MD (Cardiology)    History     Patient Active Problem List   Diagnosis Code    Asthma J45.909    Chronic prostatitis N41.1    History of colonoscopy Z98.890    Acquired hypothyroidism E03.9    Low vitamin D level R79.89    Obesity (BMI 30-39. 9) E66.9    ACP (advance care planning) Z71.89    Severe obesity ([address] E66.01     Past Medical History:   Diagnosis Date    Asthma 3/30/2010    Chronic prostatitis 3/30/2010    History of colonoscopy 3/30/2010    Hypothyroid 12/20/2011    Hypothyroidism     Prostate cancer ([facility] [address] 03/2013    Dr Maye Moura Skin cancer       Past Surgical History:   Procedure Laterality Date    COLONOSCOPY N/A 5/8/2018    COLONOSCOPY performed by Yvonne Reza MD at [address] HX COLONOSCOPY  June 2013    polyps were found. Go back in 3 years.  HX ORTHOPAEDIC      left knee replacement    HX OTHER SURGICAL  early 2015    mohs surgery     Current Outpatient Medications   Medication Sig Dispense Refill    Euthyrox 75 mcg tablet TAKE 1 TABLET BY MOUTH ONCE DAILY BEFORE BREAKFAST 90 Tablet 0    finasteride (PROSCAR) 5 mg tablet TAKE 1 TABLET BY MOUTH ONCE DAILY  5    tamsulosin (FLOMAX) 0.4 mg capsule       cholecalciferol (VITAMIN D3) 1,000 unit cap 5,000 Units.  DENTA 5000 PLUS 1.1 % crea two (2) times a day.        No Known Allergies    Family History   Problem Relation Age of Onset    Diabetes Mother     Blindness Mother         due to diabetes    Kidney Disease Father     Cancer Father     Heart Surgery Father     Coronary Art Dis Father     Cancer Sister     Diabetes Brother     Breast Cancer Sister     Alcohol abuse Daughter      Social History     Tobacco Use    Smoking status: Never Smoker    Smokeless tobacco: Never Used   Substance Use Topics    Alcohol use: Yes     Comment: occasional         Colleen Fleischer, NP

## 2021-12-28 NOTE — PROGRESS NOTES
Chief Complaint   Patient presents with   [facility] Annual Wellness Visit       1. Have you been to the ER, urgent care clinic since your last visit? Hospitalized since your last visit? No    2. Have you seen or consulted any other health care providers outside of the [address] [place] [address] [place] since your last visit? Include any pap smears or colon screening. Yes When: 09/2021 Where: Dr. Karla Saul urology  Reason for visit: protaste check up    12/2021 [facility] for hemorrhoid is going to be referred to surgeon     3. For patients over 45: Has the patient had a colonoscopy? Yes, HM satisfied with blue hyperlink     If the patient is female:    4. For patients over 36: Has the patient had a mammogram? NA based on age or sex    11. For patients over 21: Has the patient had a pap smear? NA based on age or sex    3 most recent PHQ Screens 12/28/2021   Little interest or pleasure in doing things Not at all   Feeling down, depressed, irritable, or hopeless Not at all   Total Score PHQ 2 0       Fall Risk Assessment, last 12 mths 12/28/2021   Able to walk? Yes   Fall in past 12 months? 0   Do you feel unsteady?  0   Are you worried about falling 0   Number of falls in past 12 months -   Fall with injury? -       ADL Assessment 12/28/2021   Feeding yourself No Help Needed   Getting from bed to chair No Help Needed   Getting dressed No Help Needed   Bathing or showering No Help Needed   Walk across the room (includes cane/walker) No Help Needed   Using the telphone No Help Needed   Taking your medications No Help Needed   Preparing meals No Help Needed   Managing money (expenses/bills) No Help Needed   Moderately strenuous housework (laundry) No Help Needed   Shopping for personal items (toiletries/medicines) No Help Needed   Shopping for groceries No Help Needed   Driving No Help Needed   Climbing a flight of stairs No Help Needed   Getting to places beyond walking distances No Help Needed       Abuse Screening Questionnaire 12/28/2021 Do you ever feel afraid of your partner? N   Are you in a relationship with someone who physically or mentally threatens you? N   Is it safe for you to go home?  Cyndi Diaz

## 2021-12-28 NOTE — PATIENT INSTRUCTIONS
Medicare Wellness Visit, Male    The best way to live healthy is to have a lifestyle where you eat a well-balanced diet, exercise regularly, limit alcohol use, and quit all forms of tobacco/nicotine, if applicable. Regular preventive services are another way to keep healthy. Preventive services (vaccines, screening tests, monitoring & exams) can help personalize your care plan, which helps you manage your own care. Screening tests can find health problems at the earliest stages, when they are easiest to treat. Geraldinemagali follows the current, evidence-based guidelines published by the [facility] Hernan Malka ([facility]) when recommending preventive services for our patients. Because we follow these guidelines, sometimes recommendations change over time as research supports it. (For example, a prostate screening blood test is no longer routinely recommended for men with no symptoms). Of course, you and your doctor may decide to screen more often for some diseases, based on your risk and co-morbidities (chronic disease you are already diagnosed with). Preventive services for you include:  - Medicare offers their members a free annual wellness visit, which is time for you and your primary care provider to discuss and plan for your preventive service needs. Take advantage of this benefit every year!  -All adults over age 72 should receive the recommended pneumonia vaccines. Current USPSTF guidelines recommend a series of two vaccines for the best pneumonia protection.   -All adults should have a flu vaccine yearly and tetanus vaccine every 10 years.  -All adults age 48 and older should receive the shingles vaccines (series of two vaccines).        -All adults age 38-68 who are overweight should have a diabetes screening test once every three years.   -Other screening tests & preventive services for persons with diabetes include: an eye exam to screen for diabetic retinopathy, a kidney function test, a foot exam, and stricter control over your cholesterol.   -Cardiovascular screening for adults with routine risk involves an electrocardiogram (ECG) at intervals determined by the provider.   -Colorectal cancer screening should be done for adults age 54-65 with no increased risk factors for colorectal cancer. There are a number of acceptable methods of screening for this type of cancer. Each test has its own benefits and drawbacks. Discuss with your provider what is most appropriate for you during your annual wellness visit. The different tests include: colonoscopy (considered the best screening method), a fecal occult blood test, a fecal DNA test, and sigmoidoscopy.  -All adults born between [facility] should be screened once for Hepatitis C.  -An Abdominal Aortic Aneurysm (AAA) Screening is recommended for men age 73-68 who has ever smoked in their lifetime.      Here is a list of your current Health Maintenance items (your personalized list of preventive services) with a due date:  Health Maintenance Due   Topic Date Due    COVID-19 Vaccine (1) Never done    Shingles Vaccine (1 of 2) Never done    Annual Well Visit  12/24/2021

## 2022-02-15 RX ORDER — LEVOTHYROXINE SODIUM 75 UG/1
TABLET ORAL
Qty: 90 TABLET | Refills: 0 | Status: SHIPPED | OUTPATIENT
Start: 2022-02-15 | End: 2022-05-16

## 2022-03-19 PROBLEM — R79.89 LOW VITAMIN D LEVEL: Status: ACTIVE | Noted: 2017-10-16

## 2022-03-19 PROBLEM — Z71.89 ACP (ADVANCE CARE PLANNING): Status: ACTIVE | Noted: 2017-12-12

## 2022-03-19 PROBLEM — E66.01 SEVERE OBESITY (HCC): Status: ACTIVE | Noted: 2018-12-14

## 2022-03-20 PROBLEM — E66.9 OBESITY (BMI 30-39.9): Status: ACTIVE | Noted: 2017-12-12

## 2022-05-16 RX ORDER — LEVOTHYROXINE SODIUM 75 UG/1
TABLET ORAL
Qty: 90 TABLET | Refills: 0 | Status: SHIPPED | OUTPATIENT
Start: 2022-05-16 | End: 2022-08-18

## 2022-08-18 RX ORDER — LEVOTHYROXINE SODIUM 75 UG/1
TABLET ORAL
Qty: 90 TABLET | Refills: 0 | Status: SHIPPED | OUTPATIENT
Start: 2022-08-18 | End: 2022-08-22

## 2022-08-22 RX ORDER — LEVOTHYROXINE SODIUM 75 UG/1
TABLET ORAL
Qty: 90 TABLET | Refills: 0 | Status: SHIPPED | OUTPATIENT
Start: 2022-08-22

## 2022-08-26 ENCOUNTER — TELEPHONE (OUTPATIENT)
Dept: FAMILY MEDICINE CLINIC | Age: 82
End: 2022-08-26

## 2022-08-26 ENCOUNTER — OFFICE VISIT (OUTPATIENT)
Dept: FAMILY MEDICINE CLINIC | Age: 82
End: 2022-08-26
Payer: MEDICARE

## 2022-08-26 VITALS
DIASTOLIC BLOOD PRESSURE: 60 MMHG | HEIGHT: 69 IN | WEIGHT: 242 LBS | HEART RATE: 75 BPM | TEMPERATURE: 97.8 F | BODY MASS INDEX: 35.84 KG/M2 | OXYGEN SATURATION: 97 % | RESPIRATION RATE: 16 BRPM | SYSTOLIC BLOOD PRESSURE: 93 MMHG

## 2022-08-26 DIAGNOSIS — E03.9 ACQUIRED HYPOTHYROIDISM: Primary | ICD-10-CM

## 2022-08-26 PROCEDURE — G8427 DOCREV CUR MEDS BY ELIG CLIN: HCPCS | Performed by: NURSE PRACTITIONER

## 2022-08-26 PROCEDURE — G8419 CALC BMI OUT NRM PARAM NOF/U: HCPCS | Performed by: NURSE PRACTITIONER

## 2022-08-26 PROCEDURE — G8432 DEP SCR NOT DOC, RNG: HCPCS | Performed by: NURSE PRACTITIONER

## 2022-08-26 PROCEDURE — G8536 NO DOC ELDER MAL SCRN: HCPCS | Performed by: NURSE PRACTITIONER

## 2022-08-26 PROCEDURE — 99213 OFFICE O/P EST LOW 20 MIN: CPT | Performed by: NURSE PRACTITIONER

## 2022-08-26 PROCEDURE — G0463 HOSPITAL OUTPT CLINIC VISIT: HCPCS | Performed by: NURSE PRACTITIONER

## 2022-08-26 PROCEDURE — 1101F PT FALLS ASSESS-DOCD LE1/YR: CPT | Performed by: NURSE PRACTITIONER

## 2022-08-26 PROCEDURE — 1123F ACP DISCUSS/DSCN MKR DOCD: CPT | Performed by: NURSE PRACTITIONER

## 2022-08-26 NOTE — PROGRESS NOTES
[facility] Note  Subjective:      Ni Wallace is a 80 y.o. male who presents for follow up on chronic problems, he has history of hypothyroidism, obesity and BPH, he is followed by urologist for his BPH . Taking medications as prescribed, no medications side effects reported. Past Medical History:   Diagnosis Date    Asthma 3/30/2010    Chronic prostatitis 3/30/2010    History of colonoscopy 3/30/2010    Hypothyroid 12/20/2011    Hypothyroidism     Prostate cancer ([address] 03/2013    Dr Walker Car    Skin cancer      Past Surgical History:   Procedure Laterality Date    COLONOSCOPY N/A 5/8/2018    COLONOSCOPY performed by Renzo Wilhelm MD at [facility] ENDOSCOPY    HX COLONOSCOPY  June 2013    polyps were found. Go back in 3 years. HX ORTHOPAEDIC      left knee replacement    HX OTHER SURGICAL  early 2015    mohs surgery       Current Outpatient Medications   Medication Sig Dispense Refill    Euthyrox 75 mcg tablet TAKE 1 TABLET BY MOUTH ONCE DAILY BEFORE BREAKFAST 90 Tablet 0    finasteride (PROSCAR) 5 mg tablet TAKE 1 TABLET BY MOUTH ONCE DAILY  5    tamsulosin (FLOMAX) 0.4 mg capsule       cholecalciferol (VITAMIN D3) 1,000 unit cap 5,000 Units. No Known Allergies    ROS:   Complete review of systems was reviewed with pertinent information listed in HPI. Review of Systems   Constitutional: Negative. HENT: Negative. Eyes: Negative. Respiratory: Negative. Cardiovascular: Negative. Gastrointestinal: Negative. Genitourinary: Negative. Musculoskeletal: Negative. Objective:   Visit Vitals  BP 93/60 (BP Patient Position: Sitting)   Pulse 75   Temp 97.8 °F (36.6 °C) (Temporal)   Resp 16   Ht 5' 9\" (1.753 m)   Wt 242 lb (109.8 kg)   SpO2 97%   BMI 35.74 kg/m²       Vitals and Nurse Documentation reviewed. Physical Exam  Constitutional:       Appearance: Normal appearance. He is obese.    HENT:      Mouth/Throat:      Mouth: Mucous membranes are moist.   Cardiovascular:      Rate and Rhythm: Normal rate and regular rhythm. Pulses: Normal pulses. Heart sounds: Normal heart sounds. No murmur heard. Pulmonary:      Effort: Pulmonary effort is normal.      Breath sounds: Normal breath sounds. Abdominal:      General: Bowel sounds are normal.      Palpations: Abdomen is soft. Musculoskeletal:      Cervical back: Normal range of motion and neck supple. Neurological:      Mental Status: He is alert. Psychiatric:         Mood and Affect: Mood normal.         Thought Content: Thought content normal.       Assessment/Plan:     Diagnoses and all orders for this visit:    1. Acquired hypothyroidism  -     TSH 3RD GENERATION; Future  -     T4, FREE; Future    2. BMI 35.0-35.9,adult      Diet and exercise  Follow up in jan 2023 for medicare wellness          Pt expressed understanding with the diagnosis and plan        Discussed expected course/resolution/complications of diagnosis in detail with patient. Medication risks/benefits/costs/interactions/alternatives discussed with patient. Pt was given an after visit summary which includes diagnoses, current medications & vitals.   Pt expressed understanding with the diagnosis and plan

## 2022-08-26 NOTE — PROGRESS NOTES
Chief Complaint   Patient presents with    Medication Refill     Follow up         1. \"Have you been to the ER, urgent care clinic since your last visit? Hospitalized since your last visit? \" No    2. \"Have you seen or consulted any other health care providers outside of the [address] [place] [address] since your last visit? \" No     3. For patients aged 39-70: Has the patient had a colonoscopy / FIT/ Cologuard? NA - based on age      If the patient is female:    4. For patients aged 41-77: Has the patient had a mammogram within the past 2 years? NA - based on age or sex      11. For patients aged 21-65: Has the patient had a pap smear?  NA - based on age or sex         3 most recent PHQ Screens 8/26/2022   Little interest or pleasure in doing things Not at all   Feeling down, depressed, irritable, or hopeless Not at all   Total Score PHQ 2 0       Health Maintenance Due   Topic Date Due    COVID-19 Vaccine (1) Never done    Shingrix Vaccine Age 50> (1 of 2) Never done

## 2022-08-27 LAB
T4 FREE SERPL-MCNC: 1 NG/DL (ref 0.8–1.5)
TSH SERPL DL<=0.05 MIU/L-ACNC: 1.87 UIU/ML (ref 0.36–3.74)

## 2022-11-18 RX ORDER — LEVOTHYROXINE SODIUM 75 UG/1
TABLET ORAL
Qty: 90 TABLET | Refills: 0 | Status: SHIPPED | OUTPATIENT
Start: 2022-11-18

## 2022-12-30 ENCOUNTER — PATIENT MESSAGE (OUTPATIENT)
Dept: FAMILY MEDICINE CLINIC | Age: 82
End: 2022-12-30

## 2022-12-30 ENCOUNTER — TELEPHONE (OUTPATIENT)
Dept: FAMILY MEDICINE CLINIC | Age: 82
End: 2022-12-30

## 2022-12-30 NOTE — TELEPHONE ENCOUNTER
Let him know I checked his meds and there is no significant reaction with Paxlovid, but he needs to monitor the BP and if it drops 15 points or more from baseline to hold Flomax until it rises back to his baseline. I would start the Paxlovidbecuase of risk at his age of complications.

## 2023-01-20 ENCOUNTER — OFFICE VISIT (OUTPATIENT)
Dept: FAMILY MEDICINE CLINIC | Age: 83
End: 2023-01-20
Payer: MEDICARE

## 2023-01-20 VITALS
RESPIRATION RATE: 16 BRPM | OXYGEN SATURATION: 95 % | SYSTOLIC BLOOD PRESSURE: 118 MMHG | HEIGHT: 69 IN | HEART RATE: 76 BPM | DIASTOLIC BLOOD PRESSURE: 68 MMHG | WEIGHT: 246 LBS | TEMPERATURE: 97.4 F | BODY MASS INDEX: 36.43 KG/M2

## 2023-01-20 DIAGNOSIS — E03.9 ACQUIRED HYPOTHYROIDISM: ICD-10-CM

## 2023-01-20 DIAGNOSIS — R53.83 TIRED: ICD-10-CM

## 2023-01-20 DIAGNOSIS — Z00.00 MEDICARE ANNUAL WELLNESS VISIT, SUBSEQUENT: Primary | ICD-10-CM

## 2023-01-20 NOTE — PROGRESS NOTES
[facility]  Clinic Note  Subjective:      Nadya Garcia is a [de-identified] y.o. male who presents for medicare wellness and chronic problems, he has history of hypothyroidism and prostate cancer, he is followed by a urologist. He reports that feeling tired     Past Medical History:   Diagnosis Date    Asthma 3/30/2010    Chronic prostatitis 3/30/2010    History of colonoscopy 3/30/2010    Hypothyroid 12/20/2011    Hypothyroidism     Prostate cancer ([address] 03/2013    Dr Tyra Kat    Skin cancer      Past Surgical History:   Procedure Laterality Date    COLONOSCOPY N/A 5/8/2018    COLONOSCOPY performed by Nora Wilcox MD at [facility] ENDOSCOPY    HX COLONOSCOPY  June 2013    polyps were found. Go back in 3 years. HX ORTHOPAEDIC      left knee replacement    HX OTHER SURGICAL  early 2015    mohs surgery     Current Outpatient Medications   Medication Sig Dispense Refill    levothyroxine (SYNTHROID) 75 mcg tablet TAKE 1 TABLET BY MOUTH ONCE DAILY BEFORE BREAKFAST 90 Tablet 0    finasteride (PROSCAR) 5 mg tablet TAKE 1 TABLET BY MOUTH ONCE DAILY  5    cholecalciferol (VITAMIN D3) 1,000 unit cap 5,000 Units. nirmatrelvir-ritonavir (PAXLOVID) 300 mg (150 mg x 2)-100 mg Take 3 Tablets by mouth every twelve (12) hours. (Patient not taking: Reported on 1/20/2023) 1 Box 0     No Known Allergies    ROS:   Complete review of systems was reviewed with pertinent information listed in HPI. Review of Systems   Constitutional:  Positive for malaise/fatigue. HENT: Negative. Eyes: Negative. Respiratory: Negative. Cardiovascular: Negative. Gastrointestinal: Negative. Genitourinary: Negative. Musculoskeletal: Negative. Neurological: Negative. Psychiatric/Behavioral: Negative.        Objective:   Visit Vitals  /68 (BP 1 Location: Left arm, BP Patient Position: Sitting, BP Cuff Size: Large adult)   Pulse 76   Temp 97.4 °F (36.3 °C) (Temporal)   Resp 16   Ht 5' 9\" (1.753 m)   Wt 246 lb (111.6 kg)   SpO2 95%   BMI 36.33 kg/m²       Vitals and Nurse Documentation reviewed. Physical Exam  Constitutional:       Appearance: Normal appearance. HENT:      Head: Normocephalic and atraumatic. Right Ear: Tympanic membrane normal.      Left Ear: Tympanic membrane normal.      Nose: Nose normal.      Mouth/Throat:      Mouth: Mucous membranes are moist.   Cardiovascular:      Rate and Rhythm: Normal rate and regular rhythm. Pulses: Normal pulses. Heart sounds: Normal heart sounds. No murmur heard. Pulmonary:      Breath sounds: Normal breath sounds. Abdominal:      General: Bowel sounds are normal.      Palpations: Abdomen is soft. Musculoskeletal:         General: Normal range of motion. Cervical back: Normal range of motion and neck supple. Skin:     General: Skin is warm and dry. Neurological:      Mental Status: He is alert and oriented to person, place, and time. Psychiatric:         Mood and Affect: Mood normal.       Assessment/Plan:     Diagnoses and all orders for this visit:    1. Medicare annual wellness visit, subsequent    2. Acquired hypothyroidism  -     TSH 3RD GENERATION; Future  -     T4, FREE; Future    3. BMI 35.0-35.9,adult    4. Tired  -     CBC W/O DIFF; Future  -     METABOLIC PANEL, COMPREHENSIVE; Future          Pt expressed understanding with the diagnosis and plan        Discussed expected course/resolution/complications of diagnosis in detail with patient. Medication risks/benefits/costs/interactions/alternatives discussed with patient. Pt was given an after visit summary which includes diagnoses, current medications & vitals. Pt expressed understanding with the diagnosis and plan  This is the Subsequent Medicare Annual Wellness Exam, performed 12 months or more after the Initial AWV or the last Subsequent AWV    I have reviewed the patient's medical history in detail and updated the computerized patient record.        Assessment/Plan Education and counseling provided:  Are appropriate based on today's review and evaluation    1. Medicare annual wellness visit, subsequent  2. Acquired hypothyroidism  -     TSH 3RD GENERATION; Future  -     T4, FREE; Future  3. BMI 35.0-35.9,adult  4. Tired  -     CBC W/O DIFF; Future  -     METABOLIC PANEL, COMPREHENSIVE; Future       Depression Risk Factor Screening     3 most recent PHQ Screens 1/20/2023   Little interest or pleasure in doing things Not at all   Feeling down, depressed, irritable, or hopeless Not at all   Total Score PHQ 2 0   Trouble falling or staying asleep, or sleeping too much Not at all   Feeling tired or having little energy Not at all   Poor appetite, weight loss, or overeating Not at all   Feeling bad about yourself - or that you are a failure or have let yourself or your family down Not at all   Trouble concentrating on things such as school, work, reading, or watching TV Not at all   Moving or speaking so slowly that other people could have noticed; or the opposite being so fidgety that others notice Not at all   Thoughts of being better off dead, or hurting yourself in some way Not at all   PHQ 9 Score 0   How difficult have these problems made it for you to do your work, take care of your home and get along with others Not difficult at all       Alcohol & Drug Abuse Risk Screen    Do you average more than 1 drink per night or more than 7 drinks a week: No    In the past three months have you have had more than 4 drinks containing alcohol on one occasion: Yes          Functional Ability and Level of Safety    Hearing: The patient wears hearing aids. Activities of Daily Living: The home contains: handrails, grab bars, and rugs  Patient does total self care      Ambulation: with no difficulty     Fall Risk:  Fall Risk Assessment, last 12 mths 1/20/2023   Able to walk? Yes   Fall in past 12 months? 0   Do you feel unsteady?  0   Are you worried about falling 0   Number of falls in past 12 months -   Fall with injury? -      Abuse Screen:  Patient is not abused       Cognitive Screening    Has your family/caregiver stated any concerns about your memory: no     Cognitive Screening: Normal - Verbal Fluency Test    Health Maintenance Due     Health Maintenance Due   Topic Date Due    COVID-19 Vaccine (1) Never done    Shingles Vaccine (1 of 2) Never done    Flu Vaccine (1) 08/01/2022    Medicare Yearly Exam  12/29/2022       Patient Care Team   Patient Care Team:  Mendel Pedro, NP as PCP - General (Family Medicine)  Mendel Pedro, NP as PCP - [facility]  Abhijit Wells MD (Cardiovascular Disease Physician)    History     Patient Active Problem List   Diagnosis Code    Asthma J45.909    Chronic prostatitis N41.1    History of colonoscopy Z98.890    Acquired hypothyroidism E03.9    Low vitamin D level R79.89    Obesity (BMI 30-39. 9) E66.9    ACP (advance care planning) Z71.89    Severe obesity ([address] E66.01     Past Medical History:   Diagnosis Date    Asthma 3/30/2010    Chronic prostatitis 3/30/2010    History of colonoscopy 3/30/2010    Hypothyroid 12/20/2011    Hypothyroidism     Prostate cancer ([address] 03/2013    Dr Margarette Maurice    Skin cancer       Past Surgical History:   Procedure Laterality Date    COLONOSCOPY N/A 5/8/2018    COLONOSCOPY performed by Angie Frerell MD at [facility] ENDOSCOPY    HX COLONOSCOPY  June 2013    polyps were found. Go back in 3 years. HX ORTHOPAEDIC      left knee replacement    HX OTHER SURGICAL  early 2015    mohs surgery     Current Outpatient Medications   Medication Sig Dispense Refill    levothyroxine (SYNTHROID) 75 mcg tablet TAKE 1 TABLET BY MOUTH ONCE DAILY BEFORE BREAKFAST 90 Tablet 0    finasteride (PROSCAR) 5 mg tablet TAKE 1 TABLET BY MOUTH ONCE DAILY  5    cholecalciferol (VITAMIN D3) 1,000 unit cap 5,000 Units. nirmatrelvir-ritonavir (PAXLOVID) 300 mg (150 mg x 2)-100 mg Take 3 Tablets by mouth every twelve (12) hours. (Patient not taking: Reported on 1/20/2023) 1 Box 0     No Known Allergies    Family History   Problem Relation Age of Onset    Diabetes Mother     Blindness Mother         due to diabetes    Kidney Disease Father     Cancer Father     Heart Surgery Father     Coronary Art Dis Father     Cancer Sister     Diabetes Brother     Breast Cancer Sister     Alcohol abuse Daughter      Social History     Tobacco Use    Smoking status: Never    Smokeless tobacco: Never   Substance Use Topics    Alcohol use: Yes     Comment: occasional         Bear Adams NP

## 2023-01-20 NOTE — PATIENT INSTRUCTIONS
Medicare Wellness Visit, Male    The best way to live healthy is to have a lifestyle where you eat a well-balanced diet, exercise regularly, limit alcohol use, and quit all forms of tobacco/nicotine, if applicable. Regular preventive services are another way to keep healthy. Preventive services (vaccines, screening tests, monitoring & exams) can help personalize your care plan, which helps you manage your own care. Screening tests can find health problems at the earliest stages, when they are easiest to treat. Geraldinemagali follows the current, evidence-based guidelines published by the [facility] Hernan Malka ([facility]) when recommending preventive services for our patients. Because we follow these guidelines, sometimes recommendations change over time as research supports it. (For example, a prostate screening blood test is no longer routinely recommended for men with no symptoms). Of course, you and your doctor may decide to screen more often for some diseases, based on your risk and co-morbidities (chronic disease you are already diagnosed with). Preventive services for you include:  - Medicare offers their members a free annual wellness visit, which is time for you and your primary care provider to discuss and plan for your preventive service needs.  Take advantage of this benefit every year!    -Over the age of 72 should receive the recommended pneumonia vaccines.   -All adults should have a flu vaccine yearly.  -All adults should receive a tetanus vaccine every 10 years.  -Over the age of 48 should receive the shingles vaccines.    -All adults should be screened once for Hepatitis C.  -All adults age 38-68 who are overweight should have a diabetes screening test once every three years.   -Other screening tests & preventive services for persons with diabetes include: an eye exam to screen for diabetic retinopathy, a kidney function test, a foot exam, and stricter control over your cholesterol.   -Cardiovascular screening for adults with routine risk involves an electrocardiogram (ECG) at intervals determined by the provider.     -Colorectal cancer screening should be done for adults age 43-69 with no increased risk factors for colorectal cancer. There are a number of acceptable methods of screening for this type of cancer. Each test has its own benefits and drawbacks. Discuss with your provider what is most appropriate for you during your annual wellness visit. The different tests include: colonoscopy (considered the best screening method), a fecal occult blood test, a fecal DNA test, and sigmoidoscopy.    -Lung cancer screening is recommended annually with a low dose CT scan for adults between age 54 and 68, who have smoked at least 30 pack years (equivalent of 1 pack per day for 30 days), and who is a current smoker or quit less than 15 years ago. -An Abdominal Aortic Aneurysm (AAA) Screening is recommended for men age 73-68 who has ever smoked in their lifetime.      Here is a list of your current Health Maintenance items (your personalized list of preventive services) with a due date:  Health Maintenance Due   Topic Date Due    COVID-19 Vaccine (1) Never done    Shingles Vaccine (1 of 2) Never done    Yearly Flu Vaccine (1) 08/01/2022    Annual Well Visit  12/29/2022

## 2023-01-20 NOTE — PROGRESS NOTES
Chief Complaint   Patient presents with    Annual Wellness Visit     Fasting. 1. \"Have you been to the ER, urgent care clinic since your last visit? Hospitalized since your last visit? \" No    2. \"Have you seen or consulted any other health care providers outside of the [address] [place] [address] since your last visit? \" No     3. For patients aged 39-70: Has the patient had a colonoscopy / FIT/ Cologuard? Yes - no Care Gap present      If the patient is female:    4. For patients aged 41-77: Has the patient had a mammogram within the past 2 years? NA - based on age or sex      11. For patients aged 21-65: Has the patient had a pap smear?  NA - based on age or sex         3 most recent PHQ Screens 1/20/2023   Little interest or pleasure in doing things Not at all   Feeling down, depressed, irritable, or hopeless Not at all   Total Score PHQ 2 0   Trouble falling or staying asleep, or sleeping too much Not at all   Feeling tired or having little energy Not at all   Poor appetite, weight loss, or overeating Not at all   Feeling bad about yourself - or that you are a failure or have let yourself or your family down Not at all   Trouble concentrating on things such as school, work, reading, or watching TV Not at all   Moving or speaking so slowly that other people could have noticed; or the opposite being so fidgety that others notice Not at all   Thoughts of being better off dead, or hurting yourself in some way Not at all   PHQ 9 Score 0   How difficult have these problems made it for you to do your work, take care of your home and get along with others Not difficult at all       Health Maintenance Due   Topic Date Due    COVID-19 Vaccine (1) Never done    Shingles Vaccine (1 of 2) Never done    Flu Vaccine (1) 08/01/2022    Medicare Yearly Exam  12/29/2022

## 2023-01-21 LAB
ALBUMIN SERPL-MCNC: 3.9 G/DL (ref 3.5–5)
ALBUMIN/GLOB SERPL: 1.5 (ref 1.1–2.2)
ALP SERPL-CCNC: 61 U/L (ref 45–117)
ALT SERPL-CCNC: 21 U/L (ref 12–78)
ANION GAP SERPL CALC-SCNC: 4 MMOL/L (ref 5–15)
AST SERPL-CCNC: 19 U/L (ref 15–37)
BILIRUB SERPL-MCNC: 0.5 MG/DL (ref 0.2–1)
BUN SERPL-MCNC: 19 MG/DL (ref 6–20)
BUN/CREAT SERPL: 15 (ref 12–20)
CALCIUM SERPL-MCNC: 9.2 MG/DL (ref 8.5–10.1)
CHLORIDE SERPL-SCNC: 107 MMOL/L (ref 97–108)
CO2 SERPL-SCNC: 30 MMOL/L (ref 21–32)
CREAT SERPL-MCNC: 1.26 MG/DL (ref 0.7–1.3)
ERYTHROCYTE [DISTWIDTH] IN BLOOD BY AUTOMATED COUNT: 12.3 % (ref 11.5–14.5)
GLOBULIN SER CALC-MCNC: 2.6 G/DL (ref 2–4)
GLUCOSE SERPL-MCNC: 103 MG/DL (ref 65–100)
HCT VFR BLD AUTO: 41 % (ref 36.6–50.3)
HGB BLD-MCNC: 13.6 G/DL (ref 12.1–17)
MCH RBC QN AUTO: 32.4 PG (ref 26–34)
MCHC RBC AUTO-ENTMCNC: 33.2 G/DL (ref 30–36.5)
MCV RBC AUTO: 97.6 FL (ref 80–99)
NRBC # BLD: 0 K/UL (ref 0–0.01)
NRBC BLD-RTO: 0 PER 100 WBC
PLATELET # BLD AUTO: 163 K/UL (ref 150–400)
PMV BLD AUTO: 9.6 FL (ref 8.9–12.9)
POTASSIUM SERPL-SCNC: 4.4 MMOL/L (ref 3.5–5.1)
PROT SERPL-MCNC: 6.5 G/DL (ref 6.4–8.2)
RBC # BLD AUTO: 4.2 M/UL (ref 4.1–5.7)
SODIUM SERPL-SCNC: 141 MMOL/L (ref 136–145)
T4 FREE SERPL-MCNC: 0.9 NG/DL (ref 0.8–1.5)
TSH SERPL DL<=0.05 MIU/L-ACNC: 2.6 UIU/ML (ref 0.36–3.74)
WBC # BLD AUTO: 7 K/UL (ref 4.1–11.1)

## 2023-02-07 RX ORDER — LEVOTHYROXINE SODIUM 75 UG/1
TABLET ORAL
Qty: 90 TABLET | Refills: 0 | Status: SHIPPED | OUTPATIENT
Start: 2023-02-07

## 2023-02-13 NOTE — TELEPHONE ENCOUNTER
Chief Complaint   Patient presents with    Medication Refill     levothyroxine (SYNTHROID) 75 mcg tablet      Patient last office visit was 1/20/23.

## 2023-02-14 RX ORDER — LEVOTHYROXINE SODIUM 75 UG/1
75 TABLET ORAL
Qty: 90 TABLET | Refills: 0 | Status: SHIPPED | OUTPATIENT
Start: 2023-02-14

## 2023-07-31 RX ORDER — LEVOTHYROXINE SODIUM 0.07 MG/1
75 TABLET ORAL
Qty: 90 TABLET | Refills: 0 | Status: SHIPPED | OUTPATIENT
Start: 2023-07-31

## 2023-07-31 NOTE — TELEPHONE ENCOUNTER
[facility] stating 3rd request.  Needs refill ASAP. Patient is out. Thanks, [address]    Last appointment: 1/20/23 NP [address], labs completed 1/2023  Next appointment: None- Needs new provider- Please contact patient  Previous refill encounter(s): 2/14/23 90    Requested Prescriptions     Pending Prescriptions Disp Refills    levothyroxine (SYNTHROID) 75 MCG tablet 90 tablet 0     Sig: Take 1 tablet by mouth every morning (before breakfast)     For Pharmacy Admin Tracking Only    Program: Medication Refill  CPA in place:    Recommendation Provided To:    Intervention Detail: New Rx: 1, reason: Patient Preference  Intervention Accepted By:   Ludwin Aranda Closed?:    Time Spent (min): 5

## 2023-10-26 NOTE — TELEPHONE ENCOUNTER
Spoke to pt's wife Milagro Montero) informed her that we had received a request for the pt's Synthroid. However it has yet to be filled until he re-establish's care with a new PCP,since PRIYA Armenta is no longer with the practice. Milagro Montero) stated that the pt wasn't home and that she would have him callback.   -VBN

## 2023-10-27 RX ORDER — LEVOTHYROXINE SODIUM 0.07 MG/1
TABLET ORAL
Qty: 90 TABLET | Refills: 0 | OUTPATIENT
Start: 2023-10-27

## 2023-10-31 RX ORDER — LEVOTHYROXINE SODIUM 0.07 MG/1
TABLET ORAL
Qty: 90 TABLET | Refills: 0 | Status: SHIPPED | OUTPATIENT
Start: 2023-10-31

## 2023-10-31 NOTE — TELEPHONE ENCOUNTER
PCP: Maira Oliveira APRN - NP    Last appt: 1/20/2023   Future Appointments   Date Time Provider [address]   1/29/2024  8:20 AM Jeanine Grimaldo MD [facility]       Requested Prescriptions     Pending Prescriptions Disp Refills    levothyroxine (SYNTHROID) 75 MCG tablet [Pharmacy Med Name: Levothyroxine Sodium 75 MCG Oral Tablet] 90 tablet 0     Sig: TAKE 1 TABLET BY MOUTH ONCE DAILY IN THE MORNING BEFORE BREAKFAST         Prior labs and Blood pressures:  BP Readings from Last 3 Encounters:   01/20/23 118/68   08/26/22 93/60   12/28/21 113/66     Lab Results   Component Value Date/Time     01/20/2023 10:10 AM    K 4.4 01/20/2023 10:10 AM     01/20/2023 10:10 AM    CO2 30 01/20/2023 10:10 AM    BUN 19 01/20/2023 10:10 AM    GFRAA >60 12/28/2021 09:53 AM     No results found for: \"HBA1C\", \"AIH3HMRL\"  Lab Results   Component Value Date/Time    CHOL 207 12/23/2020 09:44 AM    HDL 61 12/23/2020 09:44 AM     No results found for: \"VITD3\", \"VD3RIA\"    Lab Results   Component Value Date/Time    TSH 2.60 01/20/2023 10:10 AM

## 2024-01-12 ENCOUNTER — HOSPITAL ENCOUNTER (OUTPATIENT)
Facility: HOSPITAL | Age: 84
End: 2024-01-12
Payer: MEDICARE

## 2024-01-12 DIAGNOSIS — R19.4 CHANGE IN BOWEL HABITS: ICD-10-CM

## 2024-01-12 DIAGNOSIS — K57.31 DIVERTICULOSIS OF LARGE INTESTINE WITHOUT PERFORATION OR ABSCESS WITH BLEEDING: ICD-10-CM

## 2024-01-12 DIAGNOSIS — Z85.46 PERSONAL HISTORY OF MALIGNANT NEOPLASM OF PROSTATE: ICD-10-CM

## 2024-01-12 LAB — CREAT BLD-MCNC: 1.1 MG/DL (ref 0.6–1.3)

## 2024-01-12 PROCEDURE — 74177 CT ABD & PELVIS W/CONTRAST: CPT

## 2024-01-12 PROCEDURE — 82565 ASSAY OF CREATININE: CPT

## 2024-01-12 PROCEDURE — 6360000004 HC RX CONTRAST MEDICATION: Performed by: RADIOLOGY

## 2024-01-12 RX ADMIN — IOPAMIDOL 100 ML: 755 INJECTION, SOLUTION INTRAVENOUS at 13:47

## 2024-01-26 RX ORDER — LEVOTHYROXINE SODIUM 0.07 MG/1
TABLET ORAL
Qty: 7 TABLET | Refills: 0 | Status: SHIPPED | OUTPATIENT
Start: 2024-01-26

## 2024-01-26 NOTE — TELEPHONE ENCOUNTER
Patient LOV: 1/20/2023   Next OV: 1/29/2024   Former NP Geovany patient establishing with you next week. If okay, please send temp supply. Pt has not run out of med yet, Dr. Loza had sent a temp supply for pt back in 10/2023.    Please send to pharmacy on file, if okay..

## 2024-01-29 ENCOUNTER — OFFICE VISIT (OUTPATIENT)
Age: 84
End: 2024-01-29
Payer: MEDICARE

## 2024-01-29 VITALS
SYSTOLIC BLOOD PRESSURE: 108 MMHG | OXYGEN SATURATION: 98 % | HEART RATE: 70 BPM | BODY MASS INDEX: 35.99 KG/M2 | DIASTOLIC BLOOD PRESSURE: 64 MMHG | TEMPERATURE: 97.9 F | HEIGHT: 69 IN | WEIGHT: 243 LBS

## 2024-01-29 DIAGNOSIS — E55.9 VITAMIN D DEFICIENCY, UNSPECIFIED: ICD-10-CM

## 2024-01-29 DIAGNOSIS — R73.9 HYPERGLYCEMIA: ICD-10-CM

## 2024-01-29 DIAGNOSIS — Z00.00 MEDICARE ANNUAL WELLNESS VISIT, SUBSEQUENT: Primary | ICD-10-CM

## 2024-01-29 DIAGNOSIS — E03.9 ACQUIRED HYPOTHYROIDISM: ICD-10-CM

## 2024-01-29 DIAGNOSIS — E78.2 MIXED HYPERLIPIDEMIA: ICD-10-CM

## 2024-01-29 DIAGNOSIS — Z23 NEED FOR PNEUMOCOCCAL VACCINATION: ICD-10-CM

## 2024-01-29 LAB
25(OH)D3 SERPL-MCNC: 58.9 NG/ML (ref 30–100)
ALBUMIN SERPL-MCNC: 3.6 G/DL (ref 3.5–5)
ALBUMIN/GLOB SERPL: 1.3 (ref 1.1–2.2)
ALP SERPL-CCNC: 62 U/L (ref 45–117)
ALT SERPL-CCNC: 27 U/L (ref 12–78)
ANION GAP SERPL CALC-SCNC: 2 MMOL/L (ref 5–15)
AST SERPL-CCNC: 25 U/L (ref 15–37)
BASOPHILS # BLD: 0 K/UL (ref 0–0.1)
BASOPHILS NFR BLD: 0 % (ref 0–1)
BILIRUB SERPL-MCNC: 0.8 MG/DL (ref 0.2–1)
BUN SERPL-MCNC: 17 MG/DL (ref 6–20)
BUN/CREAT SERPL: 16 (ref 12–20)
CALCIUM SERPL-MCNC: 9.2 MG/DL (ref 8.5–10.1)
CHLORIDE SERPL-SCNC: 108 MMOL/L (ref 97–108)
CHOLEST SERPL-MCNC: 192 MG/DL
CO2 SERPL-SCNC: 29 MMOL/L (ref 21–32)
CREAT SERPL-MCNC: 1.06 MG/DL (ref 0.7–1.3)
DIFFERENTIAL METHOD BLD: ABNORMAL
EOSINOPHIL # BLD: 0.4 K/UL (ref 0–0.4)
EOSINOPHIL NFR BLD: 6 % (ref 0–7)
ERYTHROCYTE [DISTWIDTH] IN BLOOD BY AUTOMATED COUNT: 12.4 % (ref 11.5–14.5)
EST. AVERAGE GLUCOSE BLD GHB EST-MCNC: 117 MG/DL
GLOBULIN SER CALC-MCNC: 2.8 G/DL (ref 2–4)
GLUCOSE SERPL-MCNC: 108 MG/DL (ref 65–100)
HBA1C MFR BLD: 5.7 % (ref 4–5.6)
HCT VFR BLD AUTO: 41.1 % (ref 36.6–50.3)
HDLC SERPL-MCNC: 65 MG/DL
HDLC SERPL: 3 (ref 0–5)
HGB BLD-MCNC: 13.6 G/DL (ref 12.1–17)
IMM GRANULOCYTES # BLD AUTO: 0 K/UL (ref 0–0.04)
IMM GRANULOCYTES NFR BLD AUTO: 0 % (ref 0–0.5)
LDLC SERPL CALC-MCNC: 105.2 MG/DL (ref 0–100)
LYMPHOCYTES # BLD: 1.8 K/UL (ref 0.8–3.5)
LYMPHOCYTES NFR BLD: 28 % (ref 12–49)
MCH RBC QN AUTO: 32.7 PG (ref 26–34)
MCHC RBC AUTO-ENTMCNC: 33.1 G/DL (ref 30–36.5)
MCV RBC AUTO: 98.8 FL (ref 80–99)
MONOCYTES # BLD: 0.6 K/UL (ref 0–1)
MONOCYTES NFR BLD: 10 % (ref 5–13)
NEUTS SEG # BLD: 3.6 K/UL (ref 1.8–8)
NEUTS SEG NFR BLD: 56 % (ref 32–75)
NRBC # BLD: 0 K/UL (ref 0–0.01)
NRBC BLD-RTO: 0 PER 100 WBC
PLATELET # BLD AUTO: 134 K/UL (ref 150–400)
PMV BLD AUTO: 10 FL (ref 8.9–12.9)
POTASSIUM SERPL-SCNC: 4.1 MMOL/L (ref 3.5–5.1)
PROT SERPL-MCNC: 6.4 G/DL (ref 6.4–8.2)
RBC # BLD AUTO: 4.16 M/UL (ref 4.1–5.7)
SODIUM SERPL-SCNC: 139 MMOL/L (ref 136–145)
T4 FREE SERPL-MCNC: 1 NG/DL (ref 0.8–1.5)
TRIGL SERPL-MCNC: 109 MG/DL
TSH SERPL DL<=0.05 MIU/L-ACNC: 1.68 UIU/ML (ref 0.36–3.74)
VLDLC SERPL CALC-MCNC: 21.8 MG/DL
WBC # BLD AUTO: 6.4 K/UL (ref 4.1–11.1)

## 2024-01-29 PROCEDURE — G8427 DOCREV CUR MEDS BY ELIG CLIN: HCPCS | Performed by: INTERNAL MEDICINE

## 2024-01-29 PROCEDURE — 90677 PCV20 VACCINE IM: CPT | Performed by: INTERNAL MEDICINE

## 2024-01-29 PROCEDURE — PBSHW PNEUMOCOCCAL, PCV20, PREVNAR 20, (AGE 6W+), IM, PF: Performed by: INTERNAL MEDICINE

## 2024-01-29 PROCEDURE — G0439 PPPS, SUBSEQ VISIT: HCPCS | Performed by: INTERNAL MEDICINE

## 2024-01-29 PROCEDURE — 1123F ACP DISCUSS/DSCN MKR DOCD: CPT | Performed by: INTERNAL MEDICINE

## 2024-01-29 PROCEDURE — G8417 CALC BMI ABV UP PARAM F/U: HCPCS | Performed by: INTERNAL MEDICINE

## 2024-01-29 PROCEDURE — 99213 OFFICE O/P EST LOW 20 MIN: CPT | Performed by: INTERNAL MEDICINE

## 2024-01-29 PROCEDURE — 1036F TOBACCO NON-USER: CPT | Performed by: INTERNAL MEDICINE

## 2024-01-29 PROCEDURE — G8484 FLU IMMUNIZE NO ADMIN: HCPCS | Performed by: INTERNAL MEDICINE

## 2024-01-29 RX ORDER — LEVOTHYROXINE SODIUM 0.07 MG/1
TABLET ORAL
Qty: 90 TABLET | Refills: 3 | Status: SHIPPED | OUTPATIENT
Start: 2024-01-29

## 2024-01-29 RX ORDER — TAMSULOSIN HYDROCHLORIDE 0.4 MG/1
0.4 CAPSULE ORAL DAILY
COMMUNITY
Start: 2020-01-01

## 2024-01-29 SDOH — ECONOMIC STABILITY: INCOME INSECURITY: HOW HARD IS IT FOR YOU TO PAY FOR THE VERY BASICS LIKE FOOD, HOUSING, MEDICAL CARE, AND HEATING?: NOT HARD AT ALL

## 2024-01-29 SDOH — ECONOMIC STABILITY: HOUSING INSECURITY
IN THE LAST 12 MONTHS, WAS THERE A TIME WHEN YOU DID NOT HAVE A STEADY PLACE TO SLEEP OR SLEPT IN A SHELTER (INCLUDING NOW)?: NO

## 2024-01-29 SDOH — ECONOMIC STABILITY: FOOD INSECURITY: WITHIN THE PAST 12 MONTHS, YOU WORRIED THAT YOUR FOOD WOULD RUN OUT BEFORE YOU GOT MONEY TO BUY MORE.: NEVER TRUE

## 2024-01-29 SDOH — ECONOMIC STABILITY: FOOD INSECURITY: WITHIN THE PAST 12 MONTHS, THE FOOD YOU BOUGHT JUST DIDN'T LAST AND YOU DIDN'T HAVE MONEY TO GET MORE.: NEVER TRUE

## 2024-01-29 ASSESSMENT — LIFESTYLE VARIABLES
HOW OFTEN DO YOU HAVE A DRINK CONTAINING ALCOHOL: 2-4 TIMES A MONTH
HOW MANY STANDARD DRINKS CONTAINING ALCOHOL DO YOU HAVE ON A TYPICAL DAY: 1 OR 2

## 2024-01-29 ASSESSMENT — ENCOUNTER SYMPTOMS
CHEST TIGHTNESS: 0
EYE ITCHING: 0
WHEEZING: 0
SINUS PRESSURE: 0
COUGH: 0
BACK PAIN: 0
SHORTNESS OF BREATH: 0

## 2024-01-29 ASSESSMENT — PATIENT HEALTH QUESTIONNAIRE - PHQ9
SUM OF ALL RESPONSES TO PHQ QUESTIONS 1-9: 0
1. LITTLE INTEREST OR PLEASURE IN DOING THINGS: 0
SUM OF ALL RESPONSES TO PHQ9 QUESTIONS 1 & 2: 0
2. FEELING DOWN, DEPRESSED OR HOPELESS: 0

## 2024-01-29 NOTE — PROGRESS NOTES
Chief Complaint   Patient presents with    Medicare AWV     Patient is fasting, has had flu shot already this season. PSA is done with urology.    Cough     C/o Cough x a few weeks, diagnosed with bronchitis at . Has had diarrhea after abx, but has had 3 abx in the past 6 weeks.         1. \"Have you been to the ER, urgent care clinic since your last visit?  Hospitalized since your last visit?\"  Yes. 11/2023 and 1/2023 at  for Bronchitis. Ortho on call 1/2023 after fall.      2. \"Have you seen or consulted any other health care providers outside of the [facility] since your last visit?\"       Yes, urologist.    3. For patients aged 45-75: Has the patient had a colonoscopy / FIT/ Cologuard? Not indicated, history of polyps, interested in FOBT or Cologuard.            1/29/2024     8:35 AM   PHQ-9    Little interest or pleasure in doing things 0   Feeling down, depressed, or hopeless 0   PHQ-2 Score 0   PHQ-9 Total Score 0           Financial Resource Strain: Low Risk  (1/29/2024)    Overall Financial Resource Strain (CARDIA)     Difficulty of Paying Living Expenses: Not hard at all      Food Insecurity: No Food Insecurity (1/29/2024)    Hunger Vital Sign     Worried About Running Out of Food in the Last Year: Never true     Ran Out of Food in the Last Year: Never true          Health Maintenance Due   Topic Date Due    COVID-19 Vaccine (1) Never done    Respiratory Syncytial Virus (RSV) Pregnant or age 60 yrs+ (1 - 1-dose 60+ series) Never done    Shingles vaccine (2 of 3) 12/21/2016    DTaP/Tdap/Td vaccine (2 - Td or Tdap) 07/06/2017    Depression Screen  01/20/2024    Medicare Annual Wellness Visit    Crow Newberry is here for Medicare AWV (Patient is fasting, has had flu shot already this season. PSA is done with urology.) and Cough (C/o Cough x a few weeks, diagnosed with bronchitis at . Has had diarrhea after abx, but has had 3 abx in the past 6 weeks.)    Assessment & Plan   Medicare

## 2024-06-14 NOTE — PATIENT INSTRUCTIONS
[facility]  INPATIENT SPEECH THERAPY  [facility]  DISCHARGE NOTE    TIME   SLP Individual Minutes  Time In: 1035  Time Out: 1105  Minutes: 30  Timed Code Treatment Minutes: 0 Minutes       Date: 2024  Patient Name: Deloris Watts      CSN: [id number]   : 1951  (72 y.o.)  Gender: female   Referring Physician:  Dr. Jax Whatley MD  Diagnosis: Subdural Hematoma, acute  Precautions: Fall risk, Seizure precautions  Current Diet: Soft and bite size with Thin liquids  Respiratory Status: Room Air  Swallowing Strategies: Full Upright Position, Small Bite/Sip, Oral Care after all Meals, and Intermittent Supervision Check oral cavity for residuals.       Date of Last MBS/FEES: Not Applicable    Pain:  No pain reported.    Subjective:  Patient positioned upright in recliner for noon meal.  present for family education.     Short-Term Goals:  SHORT TERM GOAL #1: Patient will safely consume a soft and bite size diet and thin liquids with use of positional/compensatory strategies to ensure adequate nutrition and hydration measures.  GOAL MET  INTERVENTIONS: Completed skilled dietary analysis with noon meal consisting of soft/bite size chicken, green beans, mashed potatoes, and thin liquids, as well as advanced trial with grapes.  Upon arrival, patient and  reporting increased mastication times and effort with swallowing meat due to drier texture and no additional provision of gravy or sauce. Provided patient with BBQ sauce to add increased moisture with patient reporting improved success with textural breakdown and management. Min cues needed to lower feet at onset of meal to optimize postioning, as well as to ensure left lateral sulci clearance post swallow with lingual sweep and liquid wash. No overt presence of bedside indicators for pharyngeal swallow impairments.     Provided written handout outlining how to prepare soft and bite size diet  Medicare Wellness Visit, Male The best way to live healthy is to have a lifestyle where you eat a well-balanced diet, exercise regularly, limit alcohol use, and quit all forms of tobacco/nicotine, if applicable. Regular preventive services are another way to keep healthy. Preventive services (vaccines, screening tests, monitoring & exams) can help personalize your care plan, which helps you manage your own care. Screening tests can find health problems at the earliest stages, when they are easiest to treat. [facility] follows the current, evidence-based guidelines published by the [facility] Hernan Malka ([facility]) when recommending preventive services for our patients. Because we follow these guidelines, sometimes recommendations change over time as research supports it. (For example, a prostate screening blood test is no longer routinely recommended for men with no symptoms). Of course, you and your doctor may decide to screen more often for some diseases, based on your risk and co-morbidities (chronic disease you are already diagnosed with). Preventive services for you include: - Medicare offers their members a free annual wellness visit, which is time for you and your primary care provider to discuss and plan for your preventive service needs. Take advantage of this benefit every year! 
-All adults over age 72 should receive the recommended pneumonia vaccines. Current USPSTF guidelines recommend a series of two vaccines for the best pneumonia protection.  
-All adults should have a flu vaccine yearly and tetanus vaccine every 10 years. 
-All adults age 48 and older should receive the shingles vaccines (series of two vaccines). -All adults age 38-68 who are overweight should have a diabetes screening test once every three years. -Other screening tests & preventive services for persons with diabetes include: an eye exam to screen for diabetic retinopathy, a kidney function test, a foot exam, and stricter control over your cholesterol.  
-Cardiovascular screening for adults with routine risk involves an electrocardiogram (ECG) at intervals determined by the provider.  
-Colorectal cancer screening should be done for adults age 54-65 with no increased risk factors for colorectal cancer. There are a number of acceptable methods of screening for this type of cancer. Each test has its own benefits and drawbacks. Discuss with your provider what is most appropriate for you during your annual wellness visit. The different tests include: colonoscopy (considered the best screening method), a fecal occult blood test, a fecal DNA test, and sigmoidoscopy. 
-All adults born between [facility] should be screened once for Hepatitis C. 
-An Abdominal Aortic Aneurysm (AAA) Screening is recommended for men age 73-68 who has ever smoked in their lifetime. Here is a list of your current Health Maintenance items (your personalized list of preventive services) with a due date: 
Health Maintenance Due Topic Date Due  Shingles Vaccine (1 of 2) 11/18/1992  Glaucoma Screening   04/11/2020

## 2024-08-29 ENCOUNTER — OFFICE VISIT (OUTPATIENT)
Age: 84
End: 2024-08-29
Payer: MEDICARE

## 2024-08-29 ENCOUNTER — ANCILLARY PROCEDURE (OUTPATIENT)
Age: 84
End: 2024-08-29
Payer: MEDICARE

## 2024-08-29 VITALS
OXYGEN SATURATION: 98 % | HEIGHT: 69 IN | BODY MASS INDEX: 35.4 KG/M2 | DIASTOLIC BLOOD PRESSURE: 66 MMHG | WEIGHT: 239 LBS | HEART RATE: 74 BPM | TEMPERATURE: 97.3 F | SYSTOLIC BLOOD PRESSURE: 113 MMHG

## 2024-08-29 DIAGNOSIS — R10.12 LEFT UPPER QUADRANT ABDOMINAL PAIN: ICD-10-CM

## 2024-08-29 DIAGNOSIS — S29.011A CHEST WALL MUSCLE STRAIN, INITIAL ENCOUNTER: ICD-10-CM

## 2024-08-29 DIAGNOSIS — S29.011A CHEST WALL MUSCLE STRAIN, INITIAL ENCOUNTER: Primary | ICD-10-CM

## 2024-08-29 DIAGNOSIS — D69.6 THROMBOCYTOPENIA, UNSPECIFIED (HCC): ICD-10-CM

## 2024-08-29 PROCEDURE — 74018 RADEX ABDOMEN 1 VIEW: CPT

## 2024-08-29 PROCEDURE — 99214 OFFICE O/P EST MOD 30 MIN: CPT | Performed by: INTERNAL MEDICINE

## 2024-08-29 PROCEDURE — 1123F ACP DISCUSS/DSCN MKR DOCD: CPT | Performed by: INTERNAL MEDICINE

## 2024-08-29 PROCEDURE — G8427 DOCREV CUR MEDS BY ELIG CLIN: HCPCS | Performed by: INTERNAL MEDICINE

## 2024-08-29 PROCEDURE — 1036F TOBACCO NON-USER: CPT | Performed by: INTERNAL MEDICINE

## 2024-08-29 PROCEDURE — 71101 X-RAY EXAM UNILAT RIBS/CHEST: CPT

## 2024-08-29 PROCEDURE — G8417 CALC BMI ABV UP PARAM F/U: HCPCS | Performed by: INTERNAL MEDICINE

## 2024-08-29 ASSESSMENT — ENCOUNTER SYMPTOMS
EYE ITCHING: 0
BACK PAIN: 0
CHEST TIGHTNESS: 0
WHEEZING: 0
SINUS PRESSURE: 0
SHORTNESS OF BREATH: 0
COUGH: 0

## 2024-08-29 NOTE — PROGRESS NOTES
Chief Complaint   Patient presents with    Injury     C/o Fall with injury in 2024. Patient was doing yard work and tripped and fell on to a metal trashcan, patient says \"he pancaked\" on top of the trash can. Pain after/sites of impact included left hand/wrist, left ribs, and did he hit his head lightly. He went to Ortho-on-call right after, xrays did not reveal any fracture per patient.     Other     Patient started with left side pain when laying down 3 months ago.           1. \"Have you been to the ER or a urgent care clinic since your last visit?  Hospitalized since your last visit?\"    YES    2. \"Have you seen or consulted any other health care providers outside of the [facility] since your last visit?\"  NO             Click Here for Release of Records Request       Health Maintenance Due   Topic Date Due    Respiratory Syncytial Virus (RSV) Pregnant or age 60 yrs+ (1 - 1-dose 60+ series) Never done    Shingles vaccine (2 of 3) 2016    DTaP/Tdap/Td vaccine (2 - Td or Tdap) 2017    COVID-19 Vaccine ( -  season) Never done    Flu vaccine (1) 2024     8:35 AM   PHQ-9    Little interest or pleasure in doing things 0   Feeling down, depressed, or hopeless 0   PHQ-2 Score 0   PHQ-9 Total Score 0           Financial Resource Strain: Low Risk  (2024)    Overall Financial Resource Strain (CARDIA)     Difficulty of Paying Living Expenses: Not hard at all      Food Insecurity: No Food Insecurity (2024)    Hunger Vital Sign     Worried About Running Out of Food in the Last Year: Never true     Ran Out of Food in the Last Year: Never true        Crow Newberry (:  1940) is a 83 y.o. male,here for evaluation of the following chief complaint(s):  Injury (C/o Fall with injury in 2024. Patient was doing yard work and tripped and fell on to a metal trashcan, patient says \"he pancaked\" on top of the trash can. Pain after/sites of

## 2024-09-17 ENCOUNTER — TELEPHONE (OUTPATIENT)
Age: 84
End: 2024-09-17

## 2024-09-17 NOTE — TELEPHONE ENCOUNTER
Patient called stated he got a call on yesterday regarding his x-ray results would like for the nurse to give a call back.    Best call back # [phone]

## 2024-09-19 NOTE — TELEPHONE ENCOUNTER
S/w patient. There is some family history of breast cancer, but not lung. Patient does not have any specific reason to think it is cancer, but since he is still having the discomfort in left side when laying, wants to rule everything out and would like to know next steps if not resolving. Discussed with MD and patient recommended to follow up with ortho for further work up, he states he already sees a orthopedic doctor for his hip, he will see if they can help with the side pain or refer to another colleague within the practice.

## 2025-01-07 DIAGNOSIS — E03.9 ACQUIRED HYPOTHYROIDISM: ICD-10-CM

## 2025-01-08 RX ORDER — LEVOTHYROXINE SODIUM 75 UG/1
TABLET ORAL
Qty: 90 TABLET | Refills: 0 | Status: SHIPPED | OUTPATIENT
Start: 2025-01-08

## 2025-01-31 SDOH — HEALTH STABILITY: PHYSICAL HEALTH: ON AVERAGE, HOW MANY DAYS PER WEEK DO YOU ENGAGE IN MODERATE TO STRENUOUS EXERCISE (LIKE A BRISK WALK)?: 3 DAYS

## 2025-01-31 SDOH — HEALTH STABILITY: PHYSICAL HEALTH: ON AVERAGE, HOW MANY MINUTES DO YOU ENGAGE IN EXERCISE AT THIS LEVEL?: 20 MIN

## 2025-01-31 ASSESSMENT — PATIENT HEALTH QUESTIONNAIRE - PHQ9
SUM OF ALL RESPONSES TO PHQ QUESTIONS 1-9: 0
SUM OF ALL RESPONSES TO PHQ QUESTIONS 1-9: 0
SUM OF ALL RESPONSES TO PHQ9 QUESTIONS 1 & 2: 0
SUM OF ALL RESPONSES TO PHQ QUESTIONS 1-9: 0
2. FEELING DOWN, DEPRESSED OR HOPELESS: NOT AT ALL
SUM OF ALL RESPONSES TO PHQ QUESTIONS 1-9: 0
1. LITTLE INTEREST OR PLEASURE IN DOING THINGS: NOT AT ALL

## 2025-01-31 ASSESSMENT — LIFESTYLE VARIABLES
HOW OFTEN DO YOU HAVE A DRINK CONTAINING ALCOHOL: 2-4 TIMES A MONTH
HOW MANY STANDARD DRINKS CONTAINING ALCOHOL DO YOU HAVE ON A TYPICAL DAY: 1
HOW MANY STANDARD DRINKS CONTAINING ALCOHOL DO YOU HAVE ON A TYPICAL DAY: 1 OR 2
HOW OFTEN DO YOU HAVE SIX OR MORE DRINKS ON ONE OCCASION: 1
HOW OFTEN DO YOU HAVE A DRINK CONTAINING ALCOHOL: 3

## 2025-02-03 ENCOUNTER — OFFICE VISIT (OUTPATIENT)
Age: 85
End: 2025-02-03
Payer: MEDICARE

## 2025-02-03 VITALS
WEIGHT: 243 LBS | HEIGHT: 69 IN | OXYGEN SATURATION: 98 % | HEART RATE: 60 BPM | TEMPERATURE: 97.9 F | BODY MASS INDEX: 35.99 KG/M2 | DIASTOLIC BLOOD PRESSURE: 66 MMHG | SYSTOLIC BLOOD PRESSURE: 116 MMHG

## 2025-02-03 DIAGNOSIS — N40.1 BPH ASSOCIATED WITH NOCTURIA: ICD-10-CM

## 2025-02-03 DIAGNOSIS — Z00.00 MEDICARE ANNUAL WELLNESS VISIT, SUBSEQUENT: Primary | ICD-10-CM

## 2025-02-03 DIAGNOSIS — E55.9 VITAMIN D DEFICIENCY, UNSPECIFIED: ICD-10-CM

## 2025-02-03 DIAGNOSIS — D69.6 THROMBOCYTOPENIA, UNSPECIFIED (HCC): ICD-10-CM

## 2025-02-03 DIAGNOSIS — E78.2 MIXED HYPERLIPIDEMIA: ICD-10-CM

## 2025-02-03 DIAGNOSIS — E03.9 ACQUIRED HYPOTHYROIDISM: ICD-10-CM

## 2025-02-03 DIAGNOSIS — R10.84 GENERALIZED ABDOMINAL PAIN: ICD-10-CM

## 2025-02-03 DIAGNOSIS — R35.1 BPH ASSOCIATED WITH NOCTURIA: ICD-10-CM

## 2025-02-03 DIAGNOSIS — R73.9 HYPERGLYCEMIA: ICD-10-CM

## 2025-02-03 LAB
25(OH)D3 SERPL-MCNC: 61.9 NG/ML (ref 30–100)
ALBUMIN SERPL-MCNC: 3.7 G/DL (ref 3.5–5)
ALBUMIN/GLOB SERPL: 1.3 (ref 1.1–2.2)
ALP SERPL-CCNC: 73 U/L (ref 45–117)
ALT SERPL-CCNC: 25 U/L (ref 12–78)
ANION GAP SERPL CALC-SCNC: 6 MMOL/L (ref 2–12)
AST SERPL-CCNC: 24 U/L (ref 15–37)
BASOPHILS # BLD: 0.04 K/UL (ref 0–0.1)
BASOPHILS NFR BLD: 0.5 % (ref 0–1)
BILIRUB SERPL-MCNC: 0.5 MG/DL (ref 0.2–1)
BUN SERPL-MCNC: 15 MG/DL (ref 6–20)
BUN/CREAT SERPL: 13 (ref 12–20)
CALCIUM SERPL-MCNC: 9.4 MG/DL (ref 8.5–10.1)
CHLORIDE SERPL-SCNC: 105 MMOL/L (ref 97–108)
CHOLEST SERPL-MCNC: 205 MG/DL
CO2 SERPL-SCNC: 28 MMOL/L (ref 21–32)
CREAT SERPL-MCNC: 1.13 MG/DL (ref 0.7–1.3)
DIFFERENTIAL METHOD BLD: NORMAL
EOSINOPHIL # BLD: 0.32 K/UL (ref 0–0.4)
EOSINOPHIL NFR BLD: 3.6 % (ref 0–7)
ERYTHROCYTE [DISTWIDTH] IN BLOOD BY AUTOMATED COUNT: 11.9 % (ref 11.5–14.5)
EST. AVERAGE GLUCOSE BLD GHB EST-MCNC: 114 MG/DL
GLOBULIN SER CALC-MCNC: 2.9 G/DL (ref 2–4)
GLUCOSE SERPL-MCNC: 102 MG/DL (ref 65–100)
HBA1C MFR BLD: 5.6 % (ref 4–5.6)
HCT VFR BLD AUTO: 42.4 % (ref 36.6–50.3)
HDLC SERPL-MCNC: 67 MG/DL
HDLC SERPL: 3.1 (ref 0–5)
HGB BLD-MCNC: 13.8 G/DL (ref 12.1–17)
IMM GRANULOCYTES # BLD AUTO: 0.03 K/UL (ref 0–0.04)
IMM GRANULOCYTES NFR BLD AUTO: 0.3 % (ref 0–0.5)
LDLC SERPL CALC-MCNC: 117 MG/DL (ref 0–100)
LYMPHOCYTES # BLD: 2.81 K/UL (ref 0.8–3.5)
LYMPHOCYTES NFR BLD: 31.7 % (ref 12–49)
MCH RBC QN AUTO: 31.9 PG (ref 26–34)
MCHC RBC AUTO-ENTMCNC: 32.5 G/DL (ref 30–36.5)
MCV RBC AUTO: 98.1 FL (ref 80–99)
MONOCYTES # BLD: 0.75 K/UL (ref 0–1)
MONOCYTES NFR BLD: 8.5 % (ref 5–13)
NEUTS SEG # BLD: 4.92 K/UL (ref 1.8–8)
NEUTS SEG NFR BLD: 55.4 % (ref 32–75)
NRBC # BLD: 0 K/UL (ref 0–0.01)
NRBC BLD-RTO: 0 PER 100 WBC
PLATELET # BLD AUTO: 173 K/UL (ref 150–400)
PMV BLD AUTO: 9.3 FL (ref 8.9–12.9)
POTASSIUM SERPL-SCNC: 4.3 MMOL/L (ref 3.5–5.1)
PROT SERPL-MCNC: 6.6 G/DL (ref 6.4–8.2)
RBC # BLD AUTO: 4.32 M/UL (ref 4.1–5.7)
SODIUM SERPL-SCNC: 139 MMOL/L (ref 136–145)
T4 FREE SERPL-MCNC: 1 NG/DL (ref 0.8–1.5)
TRIGL SERPL-MCNC: 105 MG/DL
TSH SERPL DL<=0.05 MIU/L-ACNC: 2.01 UIU/ML (ref 0.36–3.74)
VLDLC SERPL CALC-MCNC: 21 MG/DL
WBC # BLD AUTO: 8.9 K/UL (ref 4.1–11.1)

## 2025-02-03 PROCEDURE — 99214 OFFICE O/P EST MOD 30 MIN: CPT | Performed by: INTERNAL MEDICINE

## 2025-02-03 SDOH — ECONOMIC STABILITY: FOOD INSECURITY: WITHIN THE PAST 12 MONTHS, YOU WORRIED THAT YOUR FOOD WOULD RUN OUT BEFORE YOU GOT MONEY TO BUY MORE.: NEVER TRUE

## 2025-02-03 SDOH — ECONOMIC STABILITY: FOOD INSECURITY: WITHIN THE PAST 12 MONTHS, THE FOOD YOU BOUGHT JUST DIDN'T LAST AND YOU DIDN'T HAVE MONEY TO GET MORE.: NEVER TRUE

## 2025-02-03 ASSESSMENT — ENCOUNTER SYMPTOMS
SINUS PRESSURE: 0
BACK PAIN: 0
WHEEZING: 0
SHORTNESS OF BREATH: 0
EYE ITCHING: 0
CHEST TIGHTNESS: 0
COUGH: 0

## 2025-02-03 NOTE — PROGRESS NOTES
Chief Complaint   Patient presents with    Medicare AWV     Patient is fasting.    Hip Pain     C/o Left hip pain or lower side pain x a few months; pain radiates.         1. \"Have you been to the ER or a urgent care clinic since your last visit?  Hospitalized since your last visit?\"    no    2. \"Have you seen or consulted any other health care providers outside of the [facility] since your last visit?\"     no      3. For patients aged 45-75: Has the patient had a colonoscopy / FIT/ Cologuard? N/a      Click Here for Release of Records Request       Health Maintenance Due   Topic Date Due    Shingles vaccine (2 of 3) 12/21/2016    DTaP/Tdap/Td vaccine (2 - Td or Tdap) 07/06/2017    Respiratory Syncytial Virus (RSV) Pregnant or age 60 yrs+ (1 - 1-dose 75+ series) Never done    Flu vaccine (1) 08/01/2024    COVID-19 Vaccine (4 - 2023-24 season) 09/01/2024 1/31/2025     4:48 AM   PHQ-9    Little interest or pleasure in doing things 0   Feeling down, depressed, or hopeless 0   PHQ-2 Score 0   PHQ-9 Total Score 0           Financial Resource Strain: Low Risk  (1/29/2024)    Overall Financial Resource Strain (CARDIA)     Difficulty of Paying Living Expenses: Not hard at all      Food Insecurity: No Food Insecurity (2/3/2025)    Hunger Vital Sign     Worried About Running Out of Food in the Last Year: Never true     Ran Out of Food in the Last Year: Never true           Medicare Annual Wellness Visit    Crow Newberry is here for Medicare AWV (Patient is fasting.) and Hip Pain (C/o Left hip pain or lower side pain x a few months; pain radiates.)    Assessment & Plan   Medicare annual wellness visit, subsequent  Acquired hypothyroidism  -     TSH + Free T4 Panel; Future  Thrombocytopenia, unspecified (HCC)  -     CBC with Auto Differential; Future  Mixed hyperlipidemia  -     CBC with Auto Differential; Future  -     Comprehensive Metabolic Panel; Future  -     Lipid Panel; Future  Vitamin D

## 2025-02-14 ENCOUNTER — HOSPITAL ENCOUNTER (OUTPATIENT)
Age: 85
Discharge: HOME | End: 2025-02-17
Payer: MEDICARE

## 2025-02-14 DIAGNOSIS — R10.84 GENERALIZED ABDOMINAL PAIN: ICD-10-CM

## 2025-02-14 PROCEDURE — 74177 CT ABD & PELVIS W/CONTRAST: CPT

## 2025-02-14 PROCEDURE — 6360000004 HC RX CONTRAST MEDICATION: Performed by: RADIOLOGY

## 2025-02-14 RX ORDER — IOPAMIDOL 755 MG/ML
100 INJECTION, SOLUTION INTRAVASCULAR
Status: COMPLETED | OUTPATIENT
Start: 2025-02-14 | End: 2025-02-14

## 2025-02-14 RX ADMIN — IOPAMIDOL 100 ML: 755 INJECTION, SOLUTION INTRAVENOUS at 08:08

## 2025-04-03 ENCOUNTER — TELEPHONE (OUTPATIENT)
Age: 85
End: 2025-04-03

## 2025-04-03 DIAGNOSIS — J40 BRONCHITIS: Primary | ICD-10-CM

## 2025-04-03 RX ORDER — ALBUTEROL SULFATE 90 UG/1
2 INHALANT RESPIRATORY (INHALATION) 4 TIMES DAILY PRN
Qty: 54 G | Refills: 1 | Status: SHIPPED | OUTPATIENT
Start: 2025-04-03

## 2025-04-03 RX ORDER — DOXYCYCLINE HYCLATE 100 MG
100 TABLET ORAL 2 TIMES DAILY
Qty: 20 TABLET | Refills: 0 | Status: SHIPPED | OUTPATIENT
Start: 2025-04-03 | End: 2025-04-13

## 2025-04-03 NOTE — TELEPHONE ENCOUNTER
Pt has cough, chest congestion and wheezing x 5 days. No fever, chills or bodyaches. Also has allergies and nasal congestion with yellow mucus production.   Will send in Abx and Inhaler to try. Make appt for next week if sx get worse.

## 2025-04-03 NOTE — TELEPHONE ENCOUNTER
Per MD, send a message to her. Patient accompanied his wife to appt today and discussed a persistent cough he has had with MD.

## 2025-04-10 DIAGNOSIS — E03.9 ACQUIRED HYPOTHYROIDISM: ICD-10-CM

## 2025-04-10 RX ORDER — LEVOTHYROXINE SODIUM 75 UG/1
75 TABLET ORAL
Qty: 90 TABLET | Refills: 1 | Status: SHIPPED | OUTPATIENT
Start: 2025-04-10

## 2025-06-10 ENCOUNTER — OFFICE VISIT (OUTPATIENT)
Age: 85
End: 2025-06-10
Payer: MEDICARE

## 2025-06-10 VITALS
SYSTOLIC BLOOD PRESSURE: 99 MMHG | HEIGHT: 69 IN | DIASTOLIC BLOOD PRESSURE: 60 MMHG | BODY MASS INDEX: 36.43 KG/M2 | HEART RATE: 82 BPM | TEMPERATURE: 97.7 F | WEIGHT: 246 LBS | OXYGEN SATURATION: 95 %

## 2025-06-10 DIAGNOSIS — M10.071 ACUTE IDIOPATHIC GOUT OF RIGHT FOOT: Primary | ICD-10-CM

## 2025-06-10 PROCEDURE — 1159F MED LIST DOCD IN RCRD: CPT | Performed by: INTERNAL MEDICINE

## 2025-06-10 PROCEDURE — 1036F TOBACCO NON-USER: CPT | Performed by: INTERNAL MEDICINE

## 2025-06-10 PROCEDURE — 99214 OFFICE O/P EST MOD 30 MIN: CPT | Performed by: INTERNAL MEDICINE

## 2025-06-10 PROCEDURE — 1123F ACP DISCUSS/DSCN MKR DOCD: CPT | Performed by: INTERNAL MEDICINE

## 2025-06-10 PROCEDURE — 1160F RVW MEDS BY RX/DR IN RCRD: CPT | Performed by: INTERNAL MEDICINE

## 2025-06-10 PROCEDURE — G8417 CALC BMI ABV UP PARAM F/U: HCPCS | Performed by: INTERNAL MEDICINE

## 2025-06-10 PROCEDURE — G8427 DOCREV CUR MEDS BY ELIG CLIN: HCPCS | Performed by: INTERNAL MEDICINE

## 2025-06-10 RX ORDER — METHYLPREDNISOLONE 4 MG/1
TABLET ORAL
Qty: 21 TABLET | Refills: 0 | Status: SHIPPED | OUTPATIENT
Start: 2025-06-10

## 2025-06-10 ASSESSMENT — ENCOUNTER SYMPTOMS
BACK PAIN: 0
CHEST TIGHTNESS: 0
SHORTNESS OF BREATH: 0
WHEEZING: 0
SINUS PRESSURE: 0
EYE ITCHING: 0
COUGH: 0

## 2025-06-10 NOTE — PROGRESS NOTES
Chief Complaint   Patient presents with    Foot Pain     C/o Right heel and outer side of foot pain and swelling since Saturday (6/7/25). Somewhat improved with boot or supportive sneakers.         1. \"Have you been to the ER or a urgent care clinic since your last visit?  Hospitalized since your last visit?\"    no    2. \"Have you seen or consulted any other health care providers outside of the [facility] since your last visit?\"   no            Click Here for Release of Records Request       Health Maintenance Due   Topic Date Due    Shingles vaccine (2 of 3) 12/21/2016    DTaP/Tdap/Td vaccine (2 - Td or Tdap) 07/06/2017    Respiratory Syncytial Virus (RSV) Pregnant or age 60 yrs+ (1 - 1-dose 75+ series) Never done    COVID-19 Vaccine (4 - 2024-25 season) 09/01/2024 1/31/2025     4:48 AM   PHQ-9    Little interest or pleasure in doing things 0   Feeling down, depressed, or hopeless 0   PHQ-2 Score 0    PHQ-9 Total Score 0        Patient-reported           Financial Resource Strain: Low Risk  (1/29/2024)    Overall Financial Resource Strain (CARDIA)     Difficulty of Paying Living Expenses: Not hard at all      Food Insecurity: No Food Insecurity (2/3/2025)    Hunger Vital Sign     Worried About Running Out of Food in the Last Year: Never true     Ran Out of Food in the Last Year: Never true             
Behavior: Behavior normal.          ASSESSMENT/PLAN:  1. Acute idiopathic gout of right foot  New onset of symptoms, will check labs and start patient on prednisone pack and anti-inflammatory.  Should use comfortable supportive shoes and increase water intake.    Low purine diet also given to patient.  -     CBC with Auto Differential; Future  -     Uric Acid; Future  -     methylPREDNISolone (MEDROL DOSEPACK) 4 MG tablet; Take by mouth as directed, Disp-21 tablet, R-0Normal  -     diclofenac (VOLTAREN) 50 MG EC tablet; Take 1 tablet by mouth 2 times daily, Disp-30 tablet, R-0Normal      --Jany Ortiz MD   Return in about 4 weeks (around 7/8/2025) for FU medications.     Treatment risks/benefits/costs/interactions/alternatives discussed with patient.  Advised patient to call back or return to office if symptoms worsen/change/persist. If patient cannot reach us or should anything more severe/urgent arise he/she should proceed directly to the nearest emergency department.  Discussed expected course/resolution/complications of diagnosis in detail with patient.  Patient expressed understanding with the diagnosis and plan.     This dictation may have been completed with Dragon, the [employer] voice recognition software.  Unanticipated grammatical, syntax, homophones, and other interpretive errors are sometimes inadvertently transcribed by the computer software.  Please disregard any errors that have escaped final proofreading.      Jany Ortiz M.D

## 2025-06-11 ENCOUNTER — RESULTS FOLLOW-UP (OUTPATIENT)
Age: 85
End: 2025-06-11

## 2025-06-11 LAB
BASOPHILS # BLD: 0.03 K/UL (ref 0–0.1)
BASOPHILS NFR BLD: 0.4 % (ref 0–1)
DIFFERENTIAL METHOD BLD: ABNORMAL
EOSINOPHIL # BLD: 0.29 K/UL (ref 0–0.4)
EOSINOPHIL NFR BLD: 3.5 % (ref 0–7)
ERYTHROCYTE [DISTWIDTH] IN BLOOD BY AUTOMATED COUNT: 12.4 % (ref 11.5–14.5)
HCT VFR BLD AUTO: 39.1 % (ref 36.6–50.3)
HGB BLD-MCNC: 13.4 G/DL (ref 12.1–17)
IMM GRANULOCYTES # BLD AUTO: 0.01 K/UL (ref 0–0.04)
IMM GRANULOCYTES NFR BLD AUTO: 0.1 % (ref 0–0.5)
LYMPHOCYTES # BLD: 2.43 K/UL (ref 0.8–3.5)
LYMPHOCYTES NFR BLD: 29.2 % (ref 12–49)
MCH RBC QN AUTO: 33.1 PG (ref 26–34)
MCHC RBC AUTO-ENTMCNC: 34.3 G/DL (ref 30–36.5)
MCV RBC AUTO: 96.5 FL (ref 80–99)
MONOCYTES # BLD: 0.65 K/UL (ref 0–1)
MONOCYTES NFR BLD: 7.8 % (ref 5–13)
NEUTS SEG # BLD: 4.91 K/UL (ref 1.8–8)
NEUTS SEG NFR BLD: 59 % (ref 32–75)
NRBC # BLD: 0 K/UL (ref 0–0.01)
NRBC BLD-RTO: 0 PER 100 WBC
PLATELET # BLD AUTO: 184 K/UL (ref 150–400)
PMV BLD AUTO: 9.8 FL (ref 8.9–12.9)
RBC # BLD AUTO: 4.05 M/UL (ref 4.1–5.7)
URATE SERPL-MCNC: 7.6 MG/DL (ref 3.5–7.2)
WBC # BLD AUTO: 8.3 K/UL (ref 4.1–11.1)

## (undated) DEVICE — NEEDLE SCLERO 23GA L4MM CATH L240CM CNTRST SHTH DIA1.8MM

## (undated) DEVICE — CATH IV AUTOGRD BC BLU 22GA 25 -- INSYTE

## (undated) DEVICE — CLIP MED L235CM L2.8MM 11MM OPN HEMSTAT FIX RESOL

## (undated) DEVICE — ENDO CARRY-ON PROCEDURE KIT INCLUDES ENZYMATIC SPONGE, GAUZE, BIOHAZARD LABEL, TRAY, LUBRICANT, DIRTY SCOPE LABEL, WATER LABEL, TRAY, DRAWSTRING PAD, AND DEFENDO 4-PIECE KIT.: Brand: ENDO CARRY-ON PROCEDURE KIT

## (undated) DEVICE — Z DISCONTINUED NO SUB IDED SET EXTN W/ 4 W STPCOCK M SPIN LOK 36IN

## (undated) DEVICE — FORCEPS BX L240CM JAW DIA2.8MM L CAP W/ NDL MIC MESH TOOTH

## (undated) DEVICE — BAG BELONG PT PERS CLEAR HANDL

## (undated) DEVICE — SNARE ENDOSCP M L240CM W27MM SHTH DIA2.4MM CHN 2.8MM OVL

## (undated) DEVICE — SYRINGE MED 20ML STD CLR PLAS LUERLOCK TIP N CTRL DISP

## (undated) DEVICE — NEEDLE HYPO 18GA L1.5IN PNK S STL HUB POLYPR SHLD REG BVL

## (undated) DEVICE — AIRLIFE™ U/CONNECT-IT OXYGEN TUBING 7 FEET (2.1 M) CRUSH-RESISTANT OXYGEN TUBING, VINYL TIPPED: Brand: AIRLIFE™

## (undated) DEVICE — Z DISCONTINUED USE 2751540 TUBING IRRIG L10IN DISP PMP ENDOGATOR

## (undated) DEVICE — CONTAINER SPEC 20 ML LID NEUT BUFF FORMALIN 10 % POLYPR STS

## (undated) DEVICE — WORKING LENGTH 235CM, WORKING CHANNEL 2.8MM: Brand: RESOLUTION 360 CLIP

## (undated) DEVICE — BAG SPEC BIOHZD LF 2MIL 6X10IN -- CONVERT TO ITEM 357326

## (undated) DEVICE — CONNECTOR TBNG AUX H2O JET DISP FOR OLY 160/180 SER

## (undated) DEVICE — TRAP SURG QUAD PARABOLA SLOT DSGN SFTY SCRN TRAPEASE

## (undated) DEVICE — 1200 GUARD II KIT W/5MM TUBE W/O VAC TUBE: Brand: GUARDIAN

## (undated) DEVICE — BW-412T DISP COMBO CLEANING BRUSH: Brand: SINGLE USE COMBINATION CLEANING BRUSH

## (undated) DEVICE — QUILTED PREMIUM COMFORT UNDERPAD,EXTRA HEAVY: Brand: WINGS

## (undated) DEVICE — KIT IV STRT W CHLORAPREP PD 1ML

## (undated) DEVICE — SET ADMIN 16ML TBNG L100IN 2 Y INJ SITE IV PIGGY BK DISP

## (undated) DEVICE — KENDALL RADIOLUCENT FOAM MONITORING ELECTRODE -RECTANGULAR SHAPE: Brand: KENDALL

## (undated) DEVICE — SOLIDIFIER FLUID 3000 CC ABSORB

## (undated) DEVICE — REM POLYHESIVE ADULT PATIENT RETURN ELECTRODE: Brand: VALLEYLAB